# Patient Record
Sex: MALE | Race: WHITE | Employment: FULL TIME | URBAN - METROPOLITAN AREA
[De-identification: names, ages, dates, MRNs, and addresses within clinical notes are randomized per-mention and may not be internally consistent; named-entity substitution may affect disease eponyms.]

---

## 2017-01-10 ENCOUNTER — TELEPHONE (OUTPATIENT)
Dept: ORTHOPEDICS | Facility: CLINIC | Age: 47
End: 2017-01-10

## 2017-01-10 NOTE — TELEPHONE ENCOUNTER
S: Pt called triage today to cx appt scheduled for 1400 as he does not have transportation.      B: Pt states he is s/p Right tibia surgery done at outside facility on 7/9/2016.  Pt states he was recently treated for post-operative infection at [facility].    A: Pt states he has a red, raised area that is irritated with a yellowish/green drainage.  Pt was unsure of what abx he was on, but states he finished his treatment approximately 1 week ago.  Pt denies fever.  Pt rates pain as 5/10 at the time of the call, and states pain is worse with activity.    R: Pt advised to seek treatment at ED today.  Pt also has a return appt scheduled for 1/24/2017 @ 0830.  Clinic RN notified of call and pt concerns.    Pt expressed understanding and will call with any additional questions or concerns.    Laurie Sears LPN

## 2017-01-24 ENCOUNTER — OFFICE VISIT (OUTPATIENT)
Dept: ORTHOPEDICS | Facility: CLINIC | Age: 47
End: 2017-01-24

## 2017-01-24 DIAGNOSIS — M25.571 PAIN IN JOINT, ANKLE AND FOOT, RIGHT: Primary | ICD-10-CM

## 2017-01-24 RX ORDER — HYDROCODONE BITARTRATE AND ACETAMINOPHEN 5; 325 MG/1; MG/1
1 TABLET ORAL
Qty: 20 TABLET | Refills: 0 | Status: SHIPPED | OUTPATIENT
Start: 2017-01-24 | End: 2017-01-31

## 2017-01-24 NOTE — Clinical Note
1/24/2017       RE: Malcolm Jesus  [address]  [facility] [address]     Dear Colleague,    Thank you for referring your patient, Malcolm Jesus, to the [facility] at [facility]. Please see a copy of my visit note below.    CHIEF COMPLAINT:  Status post right leg open reduction internal fixation performed on 11/27/2013.      HISTORY OF PRESENT ILLNESS:  Mr. Jesus presents today for further followup.  Overall, he reports to have some pain and discomfort.  Reports to have had a history of drainage and infection along his right lower leg which apparently took place in [place] in 07/2016 and more recently 2 weeks ago while being here in [place].  Reports to be going back and forth between [place] and [place] secondary to his mom having cancer and going there to help her out.  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      PHYSICAL EXAMINATION:  On today's visit, he presents with some signs of previous drainage from the wound, although on today's visit there is no redness, there is no fluid fluctuance and there is no active drainage.  The patient is currently under a p.o. antibiotic medication.  Presents with an unchanged alignment of the right lower leg with full range of motion of the ankle.      RADIOGRAPHIC EVALUATION:  Plain x-rays were reviewed today which were significant for showing some failed hardware with quite a thickening of the fibula as it seems to be connected from proximal fibula into the distal tibia, which is the way for him to proceed with weightbearing.  Presents with approximately 20 degrees of varus alignment.  He also presents with a slight anterior recurvatum of the tibia as well.      ASSESSMENT:     1.  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   2.  Right tibia failed hardware.      PLAN:  I discussed with the patient that at this point I would like to  proceed with a CT scan as a way to better understand the bony anatomy of his right lower leg.  Based on that, we will recommend to proceed with either hardware removal versus hardware removal and ORIF of the tibia.      In the meantime, he will continue with his regimen of antibiotics.  On today's visit, he was given 20 tablets of Vicodin for pain control at nighttime only.      The patient will be evaluated in a week from today.  All questions were answered.      TT 15 minutes, CT 10 minutes.           Again, thank you for allowing me to participate in the care of your patient.      Sincerely,    Les Aguilera MD

## 2017-01-24 NOTE — PROGRESS NOTES
CHIEF COMPLAINT:  Status post right leg open reduction internal fixation performed on 11/27/2013.      HISTORY OF PRESENT ILLNESS:  Mr. Jesus presents today for further followup.  Overall, he reports to have some pain and discomfort.  Reports to have had a history of drainage and infection along his right lower leg which apparently took place in [place] in 07/2016 and more recently 2 weeks ago while being here in [place].  Reports to be going back and forth between [place] and [place] secondary to his mom having cancer and going there to help her out.  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      PHYSICAL EXAMINATION:  On today's visit, he presents with some signs of previous drainage from the wound, although on today's visit there is no redness, there is no fluid fluctuance and there is no active drainage.  The patient is currently under a p.o. antibiotic medication.  Presents with an unchanged alignment of the right lower leg with full range of motion of the ankle.      RADIOGRAPHIC EVALUATION:  Plain x-rays were reviewed today which were significant for showing some failed hardware with quite a thickening of the fibula as it seems to be connected from proximal fibula into the distal tibia, which is the way for him to proceed with weightbearing.  Presents with approximately 20 degrees of varus alignment.  He also presents with a slight anterior recurvatum of the tibia as well.      ASSESSMENT:     1.  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   2.  Right tibia failed hardware.      PLAN:  I discussed with the patient that at this point I would like to proceed with a CT scan as a way to better understand the bony anatomy of his right lower leg.  Based on that, we will recommend to proceed with either hardware removal versus hardware removal and ORIF of the tibia.      In the meantime, he will continue with his regimen of antibiotics.  On  today's visit, he was given 20 tablets of Vicodin for pain control at nighttime only.      The patient will be evaluated in a week from today.  All questions were answered.      TT 15 minutes, CT 10 minutes.

## 2017-01-24 NOTE — NURSING NOTE
Reason For Visit:   Chief Complaint   Patient presents with     Musculoskeletal Problem     R lower leg infection.          Pain Assessment  Patient Currently in Pain: Yes  0-10 Pain Scale: 8  Primary Pain Location: Leg  Pain Orientation: Right, Lower  Pain Descriptors: Sharp  Alleviating Factors: Pain medication  Aggravating Factors: Walking

## 2017-01-31 ENCOUNTER — OFFICE VISIT (OUTPATIENT)
Dept: ORTHOPEDICS | Facility: CLINIC | Age: 47
End: 2017-01-31

## 2017-01-31 VITALS — WEIGHT: 223.5 LBS | HEIGHT: 70 IN | BODY MASS INDEX: 32 KG/M2

## 2017-01-31 DIAGNOSIS — M25.571 PAIN IN JOINT, ANKLE AND FOOT, RIGHT: Primary | ICD-10-CM

## 2017-01-31 RX ORDER — HYDROCODONE BITARTRATE AND ACETAMINOPHEN 5; 325 MG/1; MG/1
1 TABLET ORAL
Qty: 25 TABLET | Refills: 0 | Status: SHIPPED | OUTPATIENT
Start: 2017-01-31 | End: 2017-03-09

## 2017-01-31 ASSESSMENT — ENCOUNTER SYMPTOMS
DECREASED APPETITE: 0
WEIGHT LOSS: 0
WEIGHT GAIN: 0
FEVER: 0

## 2017-01-31 NOTE — PROGRESS NOTES
CHIEF COMPLAINT:   1.  Right tibia infection.   2.  Right tibia nonunion.   3.  Status post right tibia ORIF with bone grafting performed on 11/27/2013.      HISTORY OF PRESENT ILLNESS:  Mr. Jesus presents today for further followup.  A CT scan has been obtained which is significant for showing a nonunion across the distal tibia.  He presents with some union from the proximal tibia into the distal tibia as well.      PHYSICAL EXAMINATION:  On today's exam, he continues having some scabbing across the most anterior aspect of the leg; however, there is with no open wound, drainage or gross sign of infection.      ASSESSMENT:   1.  Right tibia nonunion.   2.  Status post right tibia ORIF.   3.  Right leg infection, possible deep.      PLAN:  I discussed with the patient that at this point my recommendation would be for hardware removal and I&D of the right lower leg.  I discussed with him the most likely postoperative course and complications from undergoing such procedure.      I discussed with him that at this point he may require to undergo a tibia ORIF, something that cannot be performed in the presence of infection.      I discussed with him what it would entail to solve the infection as well as the fact that we have some flexibility on scheduling the surgery.      The patient will be contacting us if he is visiting his mom and the infection flares up, as he will be placed on ciprofloxacin 500 mg p.o. b.i.d. for treatment of the infection until we get it surgically addressed.      All questions were answered.  The patient was pleased with the discussion.  At this point, we are not planning to remove the hardware from the fibula, we will just address the distal tibia.  Until we have evidence that the fibula is also affected, if the infection extends all the way laterally, we will have to remove the fibula hardware as well.      We had a fairly lengthy discussion with regards to the importance of avoiding nicotine  as given the fact that this will jeopardize the healing of the skin as well.      All questions were answered.  The patient was pleased with the discussion.  On today's visit, he was given a prescription for Vicodin for 25 tablets only to be utilized at night time.      TT 15 minutes, CT 10 minutes.

## 2017-01-31 NOTE — Clinical Note
1/31/2017       RE: Malcolm Jesus  [address]  [facility] [address]     Dear Colleague,    Thank you for referring your patient, Malcolm Jesus, to the [facility] at [facility]. Please see a copy of my visit note below.    CHIEF COMPLAINT:   1.  Right tibia infection.   2.  Right tibia nonunion.   3.  Status post right tibia ORIF with bone grafting performed on 11/27/2013.      HISTORY OF PRESENT ILLNESS:  Mr. Jesus presents today for further followup.  A CT scan has been obtained which is significant for showing a nonunion across the distal tibia.  He presents with some union from the proximal tibia into the distal tibia as well.      PHYSICAL EXAMINATION:  On today's exam, he continues having some scabbing across the most anterior aspect of the leg; however, there is with no open wound, drainage or gross sign of infection.      ASSESSMENT:   1.  Right tibia nonunion.   2.  Status post right tibia ORIF.   3.  Right leg infection, possible deep.      PLAN:  I discussed with the patient that at this point my recommendation would be for hardware removal and I&D of the right lower leg.  I discussed with him the most likely postoperative course and complications from undergoing such procedure.      I discussed with him that at this point he may require to undergo a tibia ORIF, something that cannot be performed in the presence of infection.      I discussed with him what it would entail to solve the infection as well as the fact that we have some flexibility on scheduling the surgery.      The patient will be contacting us if he is visiting his mom and the infection flares up, as he will be placed on ciprofloxacin 500 mg p.o. b.i.d. for treatment of the infection until we get it surgically addressed.      All questions were answered.  The patient was pleased with the discussion.  At this point, we are not planning to remove the hardware from the  fibula, we will just address the distal tibia.  Until we have evidence that the fibula is also affected, if the infection extends all the way laterally, we will have to remove the fibula hardware as well.      We had a fairly lengthy discussion with regards to the importance of avoiding nicotine as given the fact that this will jeopardize the healing of the skin as well.      All questions were answered.  The patient was pleased with the discussion.  On today's visit, he was given a prescription for Vicodin for 25 tablets only to be utilized at night time.      TT 15 minutes, CT 10 minutes.         Again, thank you for allowing me to participate in the care of your patient.      Sincerely,    Les Aguilera MD

## 2017-01-31 NOTE — NURSING NOTE
"Reason For Visit:   Chief Complaint   Patient presents with     RECHECK     s/p right tibia ORIF with bone grafting DOS 11/27/2013. CT Result.        Pain Assessment  Patient Currently in Pain: Yes  0-10 Pain Scale: 6  Primary Pain Location: Tibia  Pain Orientation: Right  Pain Descriptors: Discomfort  Alleviating Factors: Pain medication, Rest  Aggravating Factors: Sitting, Movement, Walking               HEIGHT: 5' 10\", WEIGHT: 223 lbs 8 oz, BMI: Body mass index is 32.07 kg/(m^2).      Current Outpatient Prescriptions   Medication Sig Dispense Refill     Citalopram Hydrobromide (CELEXA PO)        HYDROcodone-acetaminophen (NORCO) 5-325 MG per tablet Take 1 tablet by mouth nightly as needed for moderate to severe pain 20 tablet 0     atenolol (TENORMIN) 50 MG tablet Take 50 mg by mouth daily.       alprazolam (XANAX) 1 MG tablet Take 1 mg by mouth 2 times daily as needed             Allergies   Allergen Reactions     Toradol Hives     "

## 2017-02-02 ENCOUNTER — TELEPHONE (OUTPATIENT)
Dept: ORTHOPEDICS | Facility: CLINIC | Age: 47
End: 2017-02-02

## 2017-02-02 NOTE — TELEPHONE ENCOUNTER
Patient called stating he would like to proceed with surgery, he would like to schedule it in about 30 days as he wants to work on continuing to decrease his nicotine use.  Pre-op teaching completed over the phone, Surgery scheduler Sophie to be notified that patient wants to schedule surgery.  Pain med use discussed as well, he said it's hard but he will comply with only taking 1 hydrocodone at bedtime.  Pre-op packet will be mailed to patients home upon scheduling.

## 2017-02-24 ENCOUNTER — TELEPHONE (OUTPATIENT)
Dept: ORTHOPEDICS | Facility: CLINIC | Age: 47
End: 2017-02-24

## 2017-02-24 DIAGNOSIS — B99.9 INFECTION: Primary | ICD-10-CM

## 2017-02-24 RX ORDER — CIPROFLOXACIN 500 MG/1
500 TABLET, FILM COATED ORAL 2 TIMES DAILY
Qty: 60 TABLET | Refills: 0 | Status: SHIPPED | OUTPATIENT
Start: 2017-02-24 | End: 2017-04-12

## 2017-02-24 NOTE — TELEPHONE ENCOUNTER
Patient called stating the infection in his leg is starting to look worse, it is swollen, puffy, and has increased drainage.  Per Dr. Aguilera's last office note he stated if the patients infection flared up we would put him on cipro 500mg BID until surgery. His surgery is scheduled for 3/24/17. Prescription for Cipro sent to his pharmacy.

## 2017-03-09 DIAGNOSIS — M25.571 PAIN IN JOINT, ANKLE AND FOOT, RIGHT: ICD-10-CM

## 2017-03-09 RX ORDER — HYDROCODONE BITARTRATE AND ACETAMINOPHEN 5; 325 MG/1; MG/1
1 TABLET ORAL
Qty: 14 TABLET | Refills: 0 | Status: SHIPPED | OUTPATIENT
Start: 2017-03-09 | End: 2017-04-10

## 2017-03-09 NOTE — TELEPHONE ENCOUNTER
Patient requesting refill of his pain medication. He was given 25 tablets 1/3/17 and was instructed to take 1 tablet at . He has been complying with the instructions. His surgery is scheduled for 3/24/17. He was given a refill of 14 tablets to last him until his surgery day.  Patient verbalized understanding and is agreeable with plan. He will  the prescription at patient .

## 2017-03-21 ENCOUNTER — TELEPHONE (OUTPATIENT)
Dept: ORTHOPEDICS | Facility: CLINIC | Age: 47
End: 2017-03-21

## 2017-03-21 NOTE — TELEPHONE ENCOUNTER
Called Cole to see if he has found someone to stay with him after surgery on Friday with Dr. Aguilera at [facility]. I asked him to let me know by noon on Thursday 3/23 if he is able to have someone with him. If he can't, we will need to reschedule him. I left him my direct call back number to use when he is able.

## 2017-03-24 NOTE — TELEPHONE ENCOUNTER
Spoke with patient this morning regarding him not having anyone to stay with him after his surgery.  He stated that he does not and will not ever be able to have someone stay with him. Our  Aura Chamberlain was consulted and she is recommending Cole contact his work comp and see if they are willing to pay for a private duty nurse to stay with him for 24 hours or if they are willing to pay for a TCU placement.  Because this is work comp she stated it has to be approved by them.  Patient was given the disability linkage line [phone] that will provide them with a list of facilities that provide private duty nursing. Patient will call back to update us on what is decided.

## 2017-04-05 RX ORDER — CEFAZOLIN SODIUM 1 G/3ML
1 INJECTION, POWDER, FOR SOLUTION INTRAMUSCULAR; INTRAVENOUS SEE ADMIN INSTRUCTIONS
Status: CANCELLED | OUTPATIENT
Start: 2017-04-12

## 2017-04-10 ENCOUNTER — TELEPHONE (OUTPATIENT)
Dept: ORTHOPEDICS | Facility: CLINIC | Age: 47
End: 2017-04-10

## 2017-04-10 NOTE — TELEPHONE ENCOUNTER
Left patient a voicemail in regards to his upcoming surgery on Wednesday. Received message from [facility] stating that they do not have a pre-op physical for the patient yet. Patient told our surgery scheduler that he has a friend who is going to be with him for surgery and staying with him for 24 hours after surgery as well.  Voicemail left asking patient to call back and confirm H&P.

## 2017-04-11 ENCOUNTER — ANESTHESIA EVENT (OUTPATIENT)
Dept: SURGERY | Facility: AMBULATORY SURGERY CENTER | Age: 47
End: 2017-04-11

## 2017-04-11 ENCOUNTER — TELEPHONE (OUTPATIENT)
Dept: ORTHOPEDICS | Facility: CLINIC | Age: 47
End: 2017-04-11

## 2017-04-12 ENCOUNTER — HOSPITAL ENCOUNTER (OUTPATIENT)
Facility: AMBULATORY SURGERY CENTER | Age: 47
End: 2017-04-12
Attending: ORTHOPAEDIC SURGERY

## 2017-04-12 ENCOUNTER — ANESTHESIA (OUTPATIENT)
Dept: SURGERY | Facility: AMBULATORY SURGERY CENTER | Age: 47
End: 2017-04-12

## 2017-04-12 VITALS
DIASTOLIC BLOOD PRESSURE: 85 MMHG | HEART RATE: 88 BPM | SYSTOLIC BLOOD PRESSURE: 122 MMHG | TEMPERATURE: 98.8 F | OXYGEN SATURATION: 97 % | RESPIRATION RATE: 16 BRPM | HEIGHT: 69 IN

## 2017-04-12 DIAGNOSIS — S82.231K CLOSED DISP OBLIQUE FRACTURE OF SHAFT OF RIGHT TIBIA WITH NONUNION: Primary | ICD-10-CM

## 2017-04-12 DIAGNOSIS — B99.9 INFECTION: ICD-10-CM

## 2017-04-12 LAB
GRAM STN SPEC: NORMAL
MICRO REPORT STATUS: NORMAL
SPECIMEN SOURCE: NORMAL

## 2017-04-12 RX ORDER — FENTANYL CITRATE 50 UG/ML
25-50 INJECTION, SOLUTION INTRAMUSCULAR; INTRAVENOUS
Status: DISCONTINUED | OUTPATIENT
Start: 2017-04-12 | End: 2017-04-12 | Stop reason: HOSPADM

## 2017-04-12 RX ORDER — ONDANSETRON 2 MG/ML
4 INJECTION INTRAMUSCULAR; INTRAVENOUS EVERY 30 MIN PRN
Status: DISCONTINUED | OUTPATIENT
Start: 2017-04-12 | End: 2017-04-13 | Stop reason: HOSPADM

## 2017-04-12 RX ORDER — AMOXICILLIN 250 MG
1-2 CAPSULE ORAL 2 TIMES DAILY
Qty: 30 TABLET | Refills: 0 | Status: SHIPPED | OUTPATIENT
Start: 2017-04-12

## 2017-04-12 RX ORDER — LIDOCAINE HYDROCHLORIDE 20 MG/ML
INJECTION, SOLUTION INFILTRATION; PERINEURAL PRN
Status: DISCONTINUED | OUTPATIENT
Start: 2017-04-12 | End: 2017-04-12

## 2017-04-12 RX ORDER — ACETAMINOPHEN 325 MG/1
975 TABLET ORAL ONCE
Status: COMPLETED | OUTPATIENT
Start: 2017-04-12 | End: 2017-04-12

## 2017-04-12 RX ORDER — ONDANSETRON 4 MG/1
4-8 TABLET, ORALLY DISINTEGRATING ORAL EVERY 8 HOURS PRN
Qty: 4 TABLET | Refills: 0 | Status: SHIPPED | OUTPATIENT
Start: 2017-04-12 | End: 2018-12-01

## 2017-04-12 RX ORDER — PROPOFOL 10 MG/ML
INJECTION, EMULSION INTRAVENOUS CONTINUOUS PRN
Status: DISCONTINUED | OUTPATIENT
Start: 2017-04-12 | End: 2017-04-12

## 2017-04-12 RX ORDER — BUPIVACAINE HYDROCHLORIDE AND EPINEPHRINE 5; 5 MG/ML; UG/ML
INJECTION, SOLUTION PERINEURAL PRN
Status: DISCONTINUED | OUTPATIENT
Start: 2017-04-12 | End: 2017-04-12

## 2017-04-12 RX ORDER — CIPROFLOXACIN 500 MG/1
500 TABLET, FILM COATED ORAL 2 TIMES DAILY
Qty: 28 TABLET | Refills: 0 | Status: SHIPPED | OUTPATIENT
Start: 2017-04-12 | End: 2017-04-26

## 2017-04-12 RX ORDER — DEXAMETHASONE SODIUM PHOSPHATE 4 MG/ML
INJECTION, SOLUTION INTRA-ARTICULAR; INTRALESIONAL; INTRAMUSCULAR; INTRAVENOUS; SOFT TISSUE PRN
Status: DISCONTINUED | OUTPATIENT
Start: 2017-04-12 | End: 2017-04-12

## 2017-04-12 RX ORDER — BUPIVACAINE HYDROCHLORIDE 2.5 MG/ML
INJECTION, SOLUTION EPIDURAL; INFILTRATION; INTRACAUDAL PRN
Status: DISCONTINUED | OUTPATIENT
Start: 2017-04-12 | End: 2017-04-12

## 2017-04-12 RX ORDER — ONDANSETRON 2 MG/ML
INJECTION INTRAMUSCULAR; INTRAVENOUS PRN
Status: DISCONTINUED | OUTPATIENT
Start: 2017-04-12 | End: 2017-04-12

## 2017-04-12 RX ORDER — SODIUM CHLORIDE, SODIUM LACTATE, POTASSIUM CHLORIDE, CALCIUM CHLORIDE 600; 310; 30; 20 MG/100ML; MG/100ML; MG/100ML; MG/100ML
INJECTION, SOLUTION INTRAVENOUS CONTINUOUS
Status: DISCONTINUED | OUTPATIENT
Start: 2017-04-12 | End: 2017-04-13 | Stop reason: HOSPADM

## 2017-04-12 RX ORDER — PROPOFOL 10 MG/ML
INJECTION, EMULSION INTRAVENOUS PRN
Status: DISCONTINUED | OUTPATIENT
Start: 2017-04-12 | End: 2017-04-12

## 2017-04-12 RX ORDER — HYDROXYZINE HYDROCHLORIDE 25 MG/1
25 TABLET, FILM COATED ORAL
Status: COMPLETED | OUTPATIENT
Start: 2017-04-12 | End: 2017-04-12

## 2017-04-12 RX ORDER — KETAMINE HYDROCHLORIDE 10 MG/ML
5-10 INJECTION, SOLUTION INTRAMUSCULAR; INTRAVENOUS
Status: DISCONTINUED | OUTPATIENT
Start: 2017-04-12 | End: 2017-04-13 | Stop reason: HOSPADM

## 2017-04-12 RX ORDER — FENTANYL CITRATE 50 UG/ML
INJECTION, SOLUTION INTRAMUSCULAR; INTRAVENOUS PRN
Status: DISCONTINUED | OUTPATIENT
Start: 2017-04-12 | End: 2017-04-12

## 2017-04-12 RX ORDER — SODIUM CHLORIDE, SODIUM LACTATE, POTASSIUM CHLORIDE, CALCIUM CHLORIDE 600; 310; 30; 20 MG/100ML; MG/100ML; MG/100ML; MG/100ML
INJECTION, SOLUTION INTRAVENOUS CONTINUOUS
Status: DISCONTINUED | OUTPATIENT
Start: 2017-04-12 | End: 2017-04-12 | Stop reason: HOSPADM

## 2017-04-12 RX ORDER — IBUPROFEN 200 MG
800 TABLET ORAL ONCE
Status: COMPLETED | OUTPATIENT
Start: 2017-04-12 | End: 2017-04-12

## 2017-04-12 RX ORDER — HYDROXYZINE HYDROCHLORIDE 25 MG/1
25 TABLET, FILM COATED ORAL EVERY 6 HOURS PRN
Qty: 30 TABLET | Refills: 0 | Status: SHIPPED | OUTPATIENT
Start: 2017-04-12

## 2017-04-12 RX ORDER — ACETAMINOPHEN 325 MG/1
650 TABLET ORAL EVERY 4 HOURS PRN
Qty: 100 TABLET | Refills: 0 | Status: SHIPPED | OUTPATIENT
Start: 2017-04-12

## 2017-04-12 RX ORDER — ONDANSETRON 4 MG/1
4 TABLET, ORALLY DISINTEGRATING ORAL
Status: DISCONTINUED | OUTPATIENT
Start: 2017-04-12 | End: 2017-04-13 | Stop reason: HOSPADM

## 2017-04-12 RX ORDER — MAGNESIUM HYDROXIDE 1200 MG/15ML
LIQUID ORAL PRN
Status: DISCONTINUED | OUTPATIENT
Start: 2017-04-12 | End: 2017-04-12 | Stop reason: HOSPADM

## 2017-04-12 RX ORDER — OXYCODONE HYDROCHLORIDE 5 MG/1
5-10 TABLET ORAL
Qty: 50 TABLET | Refills: 0 | Status: SHIPPED | OUTPATIENT
Start: 2017-04-12 | End: 2017-04-19

## 2017-04-12 RX ORDER — FENTANYL CITRATE 50 UG/ML
100 INJECTION, SOLUTION INTRAMUSCULAR; INTRAVENOUS ONCE
Status: DISCONTINUED | OUTPATIENT
Start: 2017-04-12 | End: 2017-04-13 | Stop reason: HOSPADM

## 2017-04-12 RX ORDER — ACETAMINOPHEN 10 MG/ML
1000 INJECTION, SOLUTION INTRAVENOUS ONCE
Status: COMPLETED | OUTPATIENT
Start: 2017-04-12 | End: 2017-04-12

## 2017-04-12 RX ORDER — ONDANSETRON 4 MG/1
4 TABLET, ORALLY DISINTEGRATING ORAL EVERY 30 MIN PRN
Status: DISCONTINUED | OUTPATIENT
Start: 2017-04-12 | End: 2017-04-13 | Stop reason: HOSPADM

## 2017-04-12 RX ORDER — MEPERIDINE HYDROCHLORIDE 25 MG/ML
12.5 INJECTION INTRAMUSCULAR; INTRAVENOUS; SUBCUTANEOUS
Status: DISCONTINUED | OUTPATIENT
Start: 2017-04-12 | End: 2017-04-13 | Stop reason: HOSPADM

## 2017-04-12 RX ORDER — NALOXONE HYDROCHLORIDE 0.4 MG/ML
.1-.4 INJECTION, SOLUTION INTRAMUSCULAR; INTRAVENOUS; SUBCUTANEOUS
Status: DISCONTINUED | OUTPATIENT
Start: 2017-04-12 | End: 2017-04-13 | Stop reason: HOSPADM

## 2017-04-12 RX ORDER — ACETAMINOPHEN 325 MG/1
650 TABLET ORAL
Status: DISCONTINUED | OUTPATIENT
Start: 2017-04-12 | End: 2017-04-13 | Stop reason: HOSPADM

## 2017-04-12 RX ORDER — OXYCODONE HYDROCHLORIDE 5 MG/1
5-10 TABLET ORAL
Status: COMPLETED | OUTPATIENT
Start: 2017-04-12 | End: 2017-04-12

## 2017-04-12 RX ORDER — GABAPENTIN 300 MG/1
300 CAPSULE ORAL ONCE
Status: COMPLETED | OUTPATIENT
Start: 2017-04-12 | End: 2017-04-12

## 2017-04-12 RX ADMIN — SODIUM CHLORIDE, SODIUM LACTATE, POTASSIUM CHLORIDE, CALCIUM CHLORIDE: 600; 310; 30; 20 INJECTION, SOLUTION INTRAVENOUS at 09:25

## 2017-04-12 RX ADMIN — BUPIVACAINE HYDROCHLORIDE AND EPINEPHRINE 25 ML: 5; 5 INJECTION, SOLUTION PERINEURAL at 11:21

## 2017-04-12 RX ADMIN — BUPIVACAINE HYDROCHLORIDE 20 ML: 2.5 INJECTION, SOLUTION EPIDURAL; INFILTRATION; INTRACAUDAL at 11:54

## 2017-04-12 RX ADMIN — FENTANYL CITRATE 50 MCG: 50 INJECTION, SOLUTION INTRAMUSCULAR; INTRAVENOUS at 11:35

## 2017-04-12 RX ADMIN — FENTANYL CITRATE 50 MCG: 50 INJECTION, SOLUTION INTRAMUSCULAR; INTRAVENOUS at 09:54

## 2017-04-12 RX ADMIN — Medication 800 MG: at 13:00

## 2017-04-12 RX ADMIN — DEXAMETHASONE SODIUM PHOSPHATE 4 MG: 4 INJECTION, SOLUTION INTRA-ARTICULAR; INTRALESIONAL; INTRAMUSCULAR; INTRAVENOUS; SOFT TISSUE at 09:41

## 2017-04-12 RX ADMIN — PROPOFOL 200 MCG/KG/MIN: 10 INJECTION, EMULSION INTRAVENOUS at 09:38

## 2017-04-12 RX ADMIN — ACETAMINOPHEN 975 MG: 325 TABLET ORAL at 08:59

## 2017-04-12 RX ADMIN — FENTANYL CITRATE 50 MCG: 50 INJECTION, SOLUTION INTRAMUSCULAR; INTRAVENOUS at 11:12

## 2017-04-12 RX ADMIN — FENTANYL CITRATE 50 MCG: 50 INJECTION, SOLUTION INTRAMUSCULAR; INTRAVENOUS at 11:10

## 2017-04-12 RX ADMIN — PROPOFOL 100 MG: 10 INJECTION, EMULSION INTRAVENOUS at 09:39

## 2017-04-12 RX ADMIN — FENTANYL CITRATE 50 MCG: 50 INJECTION, SOLUTION INTRAMUSCULAR; INTRAVENOUS at 11:20

## 2017-04-12 RX ADMIN — Medication 150 MCG: at 09:45

## 2017-04-12 RX ADMIN — ONDANSETRON 4 MG: 2 INJECTION INTRAMUSCULAR; INTRAVENOUS at 09:41

## 2017-04-12 RX ADMIN — OXYCODONE HYDROCHLORIDE 5 MG: 5 TABLET ORAL at 12:31

## 2017-04-12 RX ADMIN — LIDOCAINE HYDROCHLORIDE 100 MG: 20 INJECTION, SOLUTION INFILTRATION; PERINEURAL at 09:38

## 2017-04-12 RX ADMIN — PROPOFOL: 10 INJECTION, EMULSION INTRAVENOUS at 09:56

## 2017-04-12 RX ADMIN — GABAPENTIN 300 MG: 300 CAPSULE ORAL at 08:59

## 2017-04-12 RX ADMIN — PROPOFOL: 10 INJECTION, EMULSION INTRAVENOUS at 10:40

## 2017-04-12 RX ADMIN — ACETAMINOPHEN 1000 MG: 10 INJECTION, SOLUTION INTRAVENOUS at 13:00

## 2017-04-12 RX ADMIN — OXYCODONE HYDROCHLORIDE 5 MG: 5 TABLET ORAL at 13:10

## 2017-04-12 RX ADMIN — Medication 0.2 MG: at 11:44

## 2017-04-12 RX ADMIN — FENTANYL CITRATE 50 MCG: 50 INJECTION, SOLUTION INTRAMUSCULAR; INTRAVENOUS at 09:58

## 2017-04-12 RX ADMIN — Medication 0.3 MG: at 11:35

## 2017-04-12 RX ADMIN — HYDROXYZINE HYDROCHLORIDE 25 MG: 25 TABLET, FILM COATED ORAL at 12:31

## 2017-04-12 RX ADMIN — PROPOFOL 300 MG: 10 INJECTION, EMULSION INTRAVENOUS at 09:38

## 2017-04-12 RX ADMIN — Medication 1 MG: at 11:06

## 2017-04-12 ASSESSMENT — LIFESTYLE VARIABLES: TOBACCO_USE: 1

## 2017-04-12 NOTE — IP AVS SNAPSHOT
[facility]    [address] [place] [id number]    Phone:  [phone]    Fax:  [phone]                                       After Visit Summary   4/12/2017    Malcolm Jesus    MRN: [id number]           After Visit Summary Signature Page     I have received my discharge instructions, and my questions have been answered. I have discussed any challenges I see with this plan with the nurse or doctor.    ..........................................................................................................................................  Patient/Patient Representative Signature      ..........................................................................................................................................  Patient Representative Print Name and Relationship to Patient    ..................................................               ................................................  Date                                            Time    ..........................................................................................................................................  Reviewed by Signature/Title    ...................................................              ..............................................  Date                                                            Time

## 2017-04-12 NOTE — OP NOTE
DATE OF PROCEDURE:  04/12/2017      PREOPERATIVE DIAGNOSES:   1.  Right tibia nonunion.   2.  Right tibia infection.      POSTOPERATIVE DIAGNOSIS:     1.  Right tibia nonunion.   2.  Right tibia infection.      PROCEDURE:     1.  Right distal tibia hardware removal.   2.  Right distal tibia irrigation and debridement.      SURGEON:  Les Aguilera MD      ASSISTANT:  Mike Bright MD, orthopedic resident       COMPLICATIONS:  None.      DRAINS:  None.      ESTIMATED BLOOD LOSS:  Less than 20 mL.      ANESTHESIA:  General endotracheal.      PATHOLOGY:  Routine cultures for aerobe, anaerobe, Gram stain and fungal from the right distal tibia, deep in nature with a clean collection x2.      INDICATIONS:  Please refer to clinic notes for further details regarding indications of Mr. Jesus's case.      DESCRIPTION OF PROCEDURE:  Patient was taken to surgery.  Preoperative antibiotics were held until satisfactory culture samples were obtained.      After successful induction of general endotracheal anesthesia, he was placed supine on the operating table.  The right lower extremity was prepped and draped in sterile fashion, and after exsanguination by gravity, tourniquet cuff was inflated to 300 mmHg along the proximal third of the right thigh.      A pause for the cause was performed according to the institution's policy which confirmed laterality to the procedure.      Following this, we proceeded with incision along the previous surgical incision for both the anterior tibia and another one slightly anterior to the former one for the fibula.  Subcutaneous tissues were dissected.  We proceeded with identification of the hardware, and we proceeded with extraction of all hardware except for the broken bones imbedded into the tibia.      The patient presented with no gross purulent material but definitely with some not healthy looking tissue, especially along the most distal portion of the plate.      Of note, the  patient presented with a prominent plate through the skin proximally which made the wound closure quite difficult given the fact that he already presented with wound dehiscence.      We proceeded with an aggressive debridement of the area with a combination of curettage, osteotomes, rongeurs, Scott elevators in order to proceed with debridement of the cortex of the tibia.  This was extended for approximately a total length of 18-20 cm by a width of approximately 3-4 cm.  A similar approach was performed across the fibula as well with similar instrumentation.      Finally, we proceeded with irrigation with a total of 2 liters of normal saline with a pulsatile lavage and we proceeded with closure of the wound with Prolene suture and a full thickness but without any deep sutures.      Prior to closure, a tourniquet cuff was deflated.      Finally, sterile dressings were applied.  The patient was placed in a toe CAM Walker and transferred in stable condition to PACU.      PLAN:  The patient will remain weightbearing as tolerated.  The patient will have cultures reviewed.  He will be contacted within 48 hours if he would require the use of a PICC line.      The patient will return to clinic in a week from surgery for a wound check and 2 weeks from surgery for suture removal.        Eventually the patient will proceed with activities as tolerated and will have to evaluate the possibility of undergoing right tibia open reduction internal fixation based on his overall response as well as the presence of infection or not.      The patient will be reevaluated for sure at 6 weeks from surgery, and at that time, AP and lateral x-rays of the tib-fib will be obtained.         ANASTACIO KESSLER MD             D: 2017 11:10   T: 2017 12:22   MT: laisha      Name:     KAILASH TAVAREZ   MRN:      [id number]        Account:        [id number]   :      1970           Procedure Date: 2017      Document: [id number]

## 2017-04-12 NOTE — BRIEF OP NOTE
Orthopedic Brief Operative Note    Pre-operative diagnosis: Right Tibia Non-Union   Post-operative diagnosis: SAME   Procedure: Procedure(s):  Right tibia and fibular removal of implants.  IRRIGATION AND DEBRIDEMENT right LOWER EXTREMITY   Surgeon: Les Aguilera, *    Assistant(s): Mike Brihgt MD   Anesthesia: General endotracheal anesthesia   Estimated blood loss: Less than 50 ml   Total IV fluids: (See anesthesia record)   Total urine output: Not measured   Drains: None   Specimens: Cultures, plates and screws removed   Implants: none   Findings: See dictated operative report for full details   Complications: None   Disposition: Stable to PACU     ___________________________________________________________________________    Postoperative Plan:  Activity:  WBAT BLE. ROM as tolerated. Restrictions: CAM boot all times when upright.   Antibiotics:  Ancef given preoperatively; pt continued on cipro 500 bid for 14 d postop until clinic f/u.   Diet:  Restart preoperative diet  Pain:  Oxycodone, acetaminophen and atarax PRN; wean as able  DVT ppx:  Ambulation.   Imaging:  Post op films in PACU.    Dispo:  Home when meeting same day criteria    Follow up:  2 weeks post op w/ Les Aguilera, *    ___________________________________________________________________________  Future Appointments  Date Time Provider Department [place]   4/26/2017 1:30 PM Nurse, [facility]   5/16/2017 1:10 PM Les Aguilera MD [facility]       ___________________________________________________________________________  Mike Bright MD  04/12/2017  Pager [phone]

## 2017-04-12 NOTE — ANESTHESIA PREPROCEDURE EVALUATION
Anesthesia Evaluation     . Pt has had prior anesthetic. Type: General           ROS/MED HX    ENT/Pulmonary:     (+)sleep apnea, tobacco use, Current use , . .    Neurologic:  - neg neurologic ROS     Cardiovascular:     (+) ----. Taking blood thinners : . . . :. .       METS/Exercise Tolerance:  >4 METS   Hematologic:  - neg hematologic  ROS       Musculoskeletal:  - neg musculoskeletal ROS       GI/Hepatic:     (+) GERD Asymptomatic on medication,       Renal/Genitourinary:  - ROS Renal section negative       Endo:  - neg endo ROS       Psychiatric:  - neg psychiatric ROS       Infectious Disease:   (+) Other Infectious Disease       Malignancy:         Other:                     Physical Exam  Normal systems: dental    Airway   Mallampati: I  TM distance: >3 FB  Neck ROM: full    Dental     Cardiovascular   Rhythm and rate: regular and normal      Pulmonary    breath sounds clear to auscultation                    Anesthesia Plan      History & Physical Review  History and physical reviewed and following examination; no interval change.    ASA Status:  2 .    NPO Status:  > 6 hours    Plan for MAC with Intravenous induction. Maintenance will be TIVA.  Reason for MAC:  Deep or markedly invasive procedure (G8)  PONV prophylaxis:  Ondansetron (or other 5HT-3) and Dexamethasone or Solumedrol       Postoperative Care  Postoperative pain management:  Oral pain medications and Multi-modal analgesia.      Consents  Anesthetic plan, risks, benefits and alternatives discussed with:  Patient..                          .

## 2017-04-12 NOTE — ANESTHESIA PROCEDURE NOTES
Peripheral Nerve Block Procedure Note    Staff:     Anesthesiologist:  BILL WARREN    Referred By:  ANASTACIO KESSLER  Location: PACU  Procedure Start/Stop TImes:      4/12/2017 11:17 AM     4/12/2017 11:27 AM    patient identified, IV checked, site marked, risks and benefits discussed, informed consent, monitors and equipment checked, pre-op evaluation, at physician/surgeon's request and post-op pain management      Correct Patient: Yes      Correct Position: Yes      Correct Site: Yes      Correct Procedure: Yes      Correct Laterality:  Yes    Site Marked:  Yes  Procedure details:     Procedure:  Popliteal    ASA:  2    Laterality:  Right    Position:  Supine    Sterile Prep: chloraprep, mask and sterile gloves      Needle:  Insulated and short bevel    Needle gauge:  21    Needle length (inches):  4    Ultrasound: Yes      Ultrasound used to identify targeted nerve, plexus, or vascular structure and placed a needle adjacent to it      Permanent Image entered into patiient's record      Abnormal pain on injection: No      Blood Aspirated: No      Paresthesias:  No    Bleeding at site: No      Bolus via:  Needle    Infusion Method:  Single Shot    Complications:  None

## 2017-04-12 NOTE — OR NURSING
Explants-Plates and Screws placed in a plastic bag with labels to be cleaned and sterilized and returned to patient at next clinic visit.

## 2017-04-12 NOTE — DISCHARGE INSTRUCTIONS
[facility]  Home Care Following Anesthesia  For 24 hours after surgery:  1. Get plenty of rest.  A responsible adult must stay with you for at least 24 hours after you leave the surgery center.  2. Do not drive or use heavy equipment.  If you have weakness or tingling, don't drive or use heavy equipment until this feeling goes away.   3. Do not drink alcohol.   4. Avoid strenuous or risky activities.  Ask for help when climbing stairs.  5. You may feel lightheaded.  IF so, sit for a few minutes before standing.  Have someone help you get up.   6. If you have nausea (feel sick to your stomach): Drink only clear liquids such as apple juice, ginger ale, broth or 7-Up.  Rest may also help.  Be sure to drink enough fluids.  Move to a regular diet as you feel able.   7. You may have a slight fever.  Call the doctor if your fever is over 100 F (37.7 C) (taken under the tongue) or lasts longer than 24 hours.  8. You may have a dry mouth, a sore throat, muscle aches or trouble sleeping. These should go away after 24 hours.  9. Do not make important or legal decisions.     Tips for taking pain medications  To get the best pain relief possible, remember these points:    Take pain medications as directed, before pain becomes severe.    Pain medication can upset your stomach: taking it with food may help.    Constipation is a common side effect of pain medication. Drink plenty of  fluids.    Eat foods high in fiber. Take a stool softener if recommended by your doctor or pharmacist.    Do not drink alcohol, drive or operate machinery while taking pain medications.    Ask about other ways to control pain, such as with heat, ice or relaxation.    Call a doctor for any of the followin. Signs of infection (fever, growing tenderness at the surgery site, a large amount of drainage or bleeding, severe pain, foul-smelling drainage, redness, swelling).  2. It has been over 8 to 10 hours since  surgery and you are still not able to urinate (pass water).  3. Headache for over 24 hours.  4. Numbness, tingling or weakness the day after surgery (if you had spinal anesthesia).  Your doctor is:       Dr. Les Aguilera, Orthopaedics: [phone]               Or dial [phone] and ask for the resident on call for:  Orthopaedics  For emergency care, call the:  [facility]:  [phone] (TTY for hearing impaired: [phone])    Post Operative Instructions: Regional Anesthetic for Lower Extremity     General Information:   Regional anesthesia is when local anesthetic or  numbing  medication is injected around the nerves to anesthetize or  numb  the area supplied by that set of nerves. It is a type of analgesia used to control pain and decreases the need for narcotics following surgery.    Types of Regional Blocks:  Femoral: A block injected into the groin area of the operative leg of a patient having thigh or knee surgery.  Adductor Canal: A block injected into the mid thigh of the operative leg of a patient having knee or ankle surgery.  Popliteal or Distal Sciatic: A block injected into the back of the knee of the operative leg of patients having foot or ankle surgery.   Ankle:  An anesthetic medication is injected into the ankle of the operative leg of a patient having foot or toe surgery.     Procedure:   The type of anesthesia your doctor used to numb your leg will usually not wear off for 6-18 hours, but may last as long as 24 hours. You should be careful during that period, since it is possible to injure your leg without being aware of the injury. While your leg is numb you should:    Use crutches (minimal weight bearing until your motor and strength is completely back to normal)    Avoid striking or bumping your leg    Avoid extreme hot or cold    Discomfort:  You will have a tingling and prickly sensation in your leg as the feeling begins to return; you can also expect some  discomfort. The amount of discomfort is unpredictable, but if you have more pain than can be controlled with pain medication, you should notify your physician.     Pain Medicine:   Begin taking your oral pain pills (if you have not already done so) before bedtime and during the night to avoid a sudden onset of pain as the block wears off.  Do not engage in drinking, driving, or hazardous occupations while taking pain medication.

## 2017-04-12 NOTE — IP AVS SNAPSHOT
MRN:[id number]                      After Visit Summary   4/12/2017    Malcolm Jesus    MRN: [id number]           Thank you!     Thank you for choosing [facility] for your care. Our goal is always to provide you with excellent care. Hearing back from our patients is one way we can continue to improve our services. Please take a few minutes to complete the written survey that you may receive in the mail after you visit with us. Thank you!        Patient Information     Date Of Birth          1970        About your hospital stay     You were admitted on:  April 12, 2017 [facility]    You were discharged on:  April 12, 2017       Who to Call     For medical emergencies, please call 911.  For non-urgent questions about your medical care, please call your primary care provider or clinic, [phone]  For questions related to your surgery, please call your surgery clinic        Attending Provider     Provider Les Day MD Orthopedics       Primary Care Provider Office Phone # Fax #    Ankit Phelan [phone]       [facility] [address] [facility] [place] [address]        After Care Instructions     Activity       Ambulate with assistance until independent            Discharge Instructions       Review discharge instructions as directed by Provider.    4/12/2017     Surgery is day surgery, meaning you come in and leave the same day.  The choice of the anesthesia that you will require will be something that you, I, and the anesthesiologist will discuss together.    Before surgery and after surgery, you should also elevate the operated extremity above the level of the heart. This may be done by placing your leg/foot on some pillows or resting it at a level above the heart.  This is important to decrease swelling, which will also decrease your level of  pain.    Following surgery, you will be given a prescription for one of several narcotic medications.  These have multiple side effects and should be discontinued as soon as you are able to tolerate non-narcotic medications.  Narcotic medications (vicodin or Hydrocodone, Percocet or Oxycodone, or Tylenol with Codeine) have been associated with nausea, drowsiness, and constipation. If you experience nausea , this may be relieved by taking the medication with food or a light meal.  To avoid constipation, over the counter stool softener or drink lots of water and eat fruits and vegetables.  Avoid operating heavy machinery or driving an automobile while on narcotic medications.    After surgery, you will be seen somewhere between 10 - 14 days for your first postoperative visit.  At the time, we will remove your dressing.     Please keep CAM boot on at all times.  Weight bearing as tolerated to the operated leg.    After your original postoperative splint is removed, showering is okay for the wound as long as the wound is not soaked or exposed to dirty water, such as swimming pools, hot tubs, baths, or dirty dishwater.    Please call or return if you experience the following:  Fevers (temperature greater than 100.4 degrees)  Pain that is getting worse or does not respond to pain medications.  Any other worrisome symptoms.    You may reach the clinic by dialing [phone]. After hours, you may reach the resident by calling [phone].    Dr Les Aguilera  [address] [address]            Discharge Instructions       Patient to arrange for return to clinic appointment in two weeks.            Elevate affected extremity           Ice to affected area       Ice pack to affected area PRN (as needed).            No dressing change       until follow up clinic appointment.            Weight bearing status - As tolerated                 Your next 10 appointments already  scheduled     Apr 26, 2017  1:30 PM CDT   (Arrive by 1:15 PM)   Post-Op with [facility]   [facility] ([facility])    [address] [facility] [id number] [phone]            May 16, 2017  1:10 PM CDT   (Arrive by 12:55 PM)   Return Visit with Les Aguilera MD   [facility] ([facility])    [address] [facility] [id number] [phone]              Further instructions from your care team       [facility]  Home Care Following Anesthesia  For 24 hours after surgery:  1. Get plenty of rest.  A responsible adult must stay with you for at least 24 hours after you leave the surgery center.  2. Do not drive or use heavy equipment.  If you have weakness or tingling, don't drive or use heavy equipment until this feeling goes away.   3. Do not drink alcohol.   4. Avoid strenuous or risky activities.  Ask for help when climbing stairs.  5. You may feel lightheaded.  IF so, sit for a few minutes before standing.  Have someone help you get up.   6. If you have nausea (feel sick to your stomach): Drink only clear liquids such as apple juice, ginger ale, broth or 7-Up.  Rest may also help.  Be sure to drink enough fluids.  Move to a regular diet as you feel able.   7. You may have a slight fever.  Call the doctor if your fever is over 100 F (37.7 C) (taken under the tongue) or lasts longer than 24 hours.  8. You may have a dry mouth, a sore throat, muscle aches or trouble sleeping. These should go away after 24 hours.  9. Do not make important or legal decisions.     Tips for taking pain medications  To get the best pain relief possible, remember these points:    Take pain medications as directed, before pain becomes severe.    Pain medication can upset your stomach: taking it with food may help.    Constipation is a common side effect of pain  medication. Drink plenty of  fluids.    Eat foods high in fiber. Take a stool softener if recommended by your doctor or pharmacist.    Do not drink alcohol, drive or operate machinery while taking pain medications.    Ask about other ways to control pain, such as with heat, ice or relaxation.    Call a doctor for any of the followin. Signs of infection (fever, growing tenderness at the surgery site, a large amount of drainage or bleeding, severe pain, foul-smelling drainage, redness, swelling).  2. It has been over 8 to 10 hours since surgery and you are still not able to urinate (pass water).  3. Headache for over 24 hours.  4. Numbness, tingling or weakness the day after surgery (if you had spinal anesthesia).  Your doctor is:       Dr. Les Aguilera, Orthopaedics: [phone]               Or dial [phone] and ask for the resident on call for:  Orthopaedics  For emergency care, call the:  [facility]:  [phone] (TTY for hearing impaired: [phone])    Post Operative Instructions: Regional Anesthetic for Lower Extremity     General Information:   Regional anesthesia is when local anesthetic or  numbing  medication is injected around the nerves to anesthetize or  numb  the area supplied by that set of nerves. It is a type of analgesia used to control pain and decreases the need for narcotics following surgery.    Types of Regional Blocks:  Femoral: A block injected into the groin area of the operative leg of a patient having thigh or knee surgery.  Adductor Canal: A block injected into the mid thigh of the operative leg of a patient having knee or ankle surgery.  Popliteal or Distal Sciatic: A block injected into the back of the knee of the operative leg of patients having foot or ankle surgery.   Ankle:  An anesthetic medication is injected into the ankle of the operative leg of a patient having foot or toe surgery.     Procedure:   The type of anesthesia your doctor used to  "numb your leg will usually not wear off for 6-18 hours, but may last as long as 24 hours. You should be careful during that period, since it is possible to injure your leg without being aware of the injury. While your leg is numb you should:    Use crutches (minimal weight bearing until your motor and strength is completely back to normal)    Avoid striking or bumping your leg    Avoid extreme hot or cold    Discomfort:  You will have a tingling and prickly sensation in your leg as the feeling begins to return; you can also expect some discomfort. The amount of discomfort is unpredictable, but if you have more pain than can be controlled with pain medication, you should notify your physician.     Pain Medicine:   Begin taking your oral pain pills (if you have not already done so) before bedtime and during the night to avoid a sudden onset of pain as the block wears off.  Do not engage in drinking, driving, or hazardous occupations while taking pain medication.               Pending Results     Date and Time Order Name Status Description    4/12/2017 1011 Gram stain Preliminary     4/12/2017 1011 Fungus Culture, non-blood Preliminary     4/12/2017 1011 Fluid Culture Aerobic Bacterial Preliminary     4/12/2017 1011 Anaerobic bacterial culture Preliminary             Admission Information     Date & Time Provider Department Dept. Phone    4/12/2017 Les Aguilera MD [facility] [phone]      Your Vitals Were     Blood Pressure Pulse Temperature Respirations Height Pulse Oximetry    122/85 88 98.8  F (37.1  C) (Temporal) 16 1.753 m (5' 9\") 97%      MyChart Information     [employer] is an electronic gateway that provides easy, online access to your medical records. With [employer], you can request a clinic appointment, read your test results, renew a prescription or communicate with your care team.     To sign up for [employer] visit the website at [email or web address]   You will be " asked to enter the access code listed below, as well as some personal information. Please follow the directions to create your username and password.     Your access code is: [email or web address]  Expires: 2017  7:30 AM     Your access code will  in 90 days. If you need help or a new code, please contact your [facility] or call [phone] for assistance.        Care EveryWhere ID     This is your Care EveryWhere ID. 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   Quantity:  30 tablet   Refills:  0         CONTINUE these medicines which have NOT CHANGED        Dose / Directions    atenolol 50 MG tablet   Commonly known as:  TENORMIN        Dose:  50 mg   Take 50 mg by mouth every morning   Refills:  0       CELEXA PO        Refills:  0       ciprofloxacin 500 MG tablet   Commonly known as:  CIPRO   Used for:  Infection        Dose:  500 mg   Take 1 tablet (500 mg) by mouth 2 times daily for 14 days   Quantity:  28 tablet   Refills:  0       XANAX 1 MG tablet   Generic drug:  ALPRAZolam        Dose:  1 mg   Take 1 mg by mouth 2 times daily as needed   Refills:  0            Where to get your medicines      These medications were sent to [address] [place] [address] [address] [place] [address]    Hours:  TRANSPLANT PHONE NUMBER [phone] Phone:  [phone]     acetaminophen 325 MG tablet    hydrOXYzine 25 MG tablet    ondansetron 4 MG ODT tab    senna-docusate 8.6-50 MG per tablet         Some of these will need a paper prescription and others can be bought over the counter. Ask your nurse if you have questions.     Bring a paper prescription for each of these medications     ciprofloxacin 500 MG tablet    oxyCODONE 5 MG IR tablet                Protect others around you: Learn how to safely use, store and throw away your medicines at www.disposemymeds.org.             Medication List: This is a list of all your medications and when to take them. Check marks below indicate your daily home schedule. Keep this list as a reference.      Medications           Morning Afternoon Evening Bedtime As Needed    acetaminophen 325 MG tablet   Commonly known as:  TYLENOL   Take 2 tablets (650 mg) by mouth every 4 hours as needed for other (mild pain)   Last time this was  given:  975 mg on 4/12/2017  8:59 AM                                atenolol 50 MG tablet   Commonly known as:  TENORMIN   Take 50 mg by mouth every morning                                CELEXA PO                                ciprofloxacin 500 MG tablet   Commonly known as:  CIPRO   Take 1 tablet (500 mg) by mouth 2 times daily for 14 days                                hydrOXYzine 25 MG tablet   Commonly known as:  ATARAX   Take 1 tablet (25 mg) by mouth every 6 hours as needed for itching (and nausea)   Last time this was given:  25 mg on 4/12/2017 12:31 PM                                ondansetron 4 MG ODT tab   Commonly known as:  ZOFRAN-ODT   Take 1-2 tablets (4-8 mg) by mouth every 8 hours as needed for nausea Dissolve ON the tongue.                                oxyCODONE 5 MG IR tablet   Commonly known as:  ROXICODONE   Take 1-2 tablets (5-10 mg) by mouth every 3 hours as needed for pain or other (Moderate to Severe)   Last time this was given:  5 mg on 4/12/2017  1:10 PM                                senna-docusate 8.6-50 MG per tablet   Commonly known as:  SENOKOT-S;PERICOLACE   Take 1-2 tablets by mouth 2 times daily Take while on oral narcotics to prevent or treat constipation.                                XANAX 1 MG tablet   Take 1 mg by mouth 2 times daily as needed   Generic drug:  ALPRAZolam

## 2017-04-12 NOTE — ANESTHESIA PROCEDURE NOTES
Peripheral Nerve Block Procedure Note    Staff:     Anesthesiologist:  BILL WARREN    Referred By:  ANASTACIO KESSLER  Location: PACU  Procedure Start/Stop TImes:      4/12/2017 11:48 AM     4/12/2017 11:58 AM    patient identified, IV checked, site marked, risks and benefits discussed, informed consent, monitors and equipment checked, pre-op evaluation, at physician/surgeon's request and post-op pain management      Correct Patient: Yes      Correct Position: Yes      Correct Site: Yes      Correct Procedure: Yes      Correct Laterality:  Yes    Site Marked:  Yes  Procedure details:     Procedure:  Adductor canal    ASA:  2    Laterality:  Right    Position:  Supine    Sterile Prep: chloraprep, mask and sterile gloves      Needle:  Insulated and short bevel    Needle gauge:  21    Needle length (inches):  4    Ultrasound: Yes      Ultrasound used to identify targeted nerve, plexus, or vascular structure and placed a needle adjacent to it      Permanent Image entered into patiient's record      Abnormal pain on injection: No      Blood Aspirated: No      Paresthesias:  No    Bleeding at site: No      Bolus via:  Needle    Infusion Method:  Single Shot    Complications:  None

## 2017-04-12 NOTE — ANESTHESIA POSTPROCEDURE EVALUATION
Patient: Malcolm Jesus    Procedure(s):  Right Tibia Irrigation and Debridement with Hardware Removal - Wound Class: III-Contaminated    Diagnosis:Right Tibia Non-Union  Diagnosis Additional Information: No value filed.    Anesthesia Type:  No value filed.    Note:  Anesthesia Post Evaluation    Patient location during evaluation: bedside  Patient participation: Able to fully participate in evaluation  Level of consciousness: awake  Pain management: adequate  Airway patency: patent  Cardiovascular status: acceptable  Respiratory status: acceptable  Hydration status: acceptable  PONV: controlled     Anesthetic complications: None    Comments: Pain control improved after popliteal and adductor block        Last vitals:  Vitals:    04/12/17 0843   BP: (!) 165/100   Pulse: 88   Resp: 16   Temp: 37.1  C (98.8  F)         Electronically Signed By: Grover Ruiz MD, MD  April 12, 2017  11:09 AM

## 2017-04-12 NOTE — ANESTHESIA CARE TRANSFER NOTE
"Patient: Malcolm Jesus    Procedure(s):  Right Tibia Irrigation and Debridement with Hardware Removal - Wound Class: III-Contaminated    Diagnosis: Right Tibia Non-Union  Diagnosis Additional Information: No value filed.    Anesthesia Type:   No value filed.     Note:  Airway :Nasal Cannula  Patient transferred to:PACU  Comments: VSS, no PONV, c/o \"burning pain\" in right leg. Medicated with additional Fentanyl 100mcg IV as ordered per ALEKSEY Ruiz MD. Report to Fariba DAVIS.      Vitals: (Last set prior to Anesthesia Care Transfer)    CRNA VITALS  4/12/2017 1034 - 4/12/2017 1112      4/12/2017             Pulse: 73    SpO2: 100 %    Resp Rate (observed): (!)  5    Resp Rate (set): 10                Electronically Signed By: ESTEFANI Srinivasan CRNA  April 12, 2017  11:12 AM  "

## 2017-04-14 DIAGNOSIS — R89.5 POSITIVE CULTURE FINDING: Primary | ICD-10-CM

## 2017-04-16 LAB
BACTERIA SPEC CULT: ABNORMAL
Lab: ABNORMAL
MICRO REPORT STATUS: ABNORMAL
MICROORGANISM SPEC CULT: ABNORMAL
SPECIMEN SOURCE: ABNORMAL

## 2017-04-17 ASSESSMENT — ENCOUNTER SYMPTOMS
HALLUCINATIONS: 0
STIFFNESS: 1
FATIGUE: 1
BLOOD IN STOOL: 0
COUGH: 1
HYPOTENSION: 0
EYE WATERING: 0
ABDOMINAL PAIN: 0
SINUS CONGESTION: 1
PANIC: 1
DYSPNEA ON EXERTION: 1
POLYDIPSIA: 1
INCREASED ENERGY: 1
NIGHT SWEATS: 1
DEPRESSION: 1
POOR WOUND HEALING: 0
WEAKNESS: 1
ALTERED TEMPERATURE REGULATION: 1
DISTURBANCES IN COORDINATION: 1
SLEEP DISTURBANCES DUE TO BREATHING: 0
BOWEL INCONTINENCE: 0
LOSS OF CONSCIOUSNESS: 0
HEMOPTYSIS: 0
WEIGHT GAIN: 1
NERVOUS/ANXIOUS: 1
MEMORY LOSS: 0
SWOLLEN GLANDS: 0
ARTHRALGIAS: 1
PALPITATIONS: 1
SPUTUM PRODUCTION: 0
EYE IRRITATION: 0
WEIGHT LOSS: 0
SEIZURES: 0
VOMITING: 0
FLANK PAIN: 0
CONSTIPATION: 0
SNORES LOUDLY: 1
DIFFICULTY URINATING: 0
DYSURIA: 0
BLOATING: 0
JOINT SWELLING: 1
EXERCISE INTOLERANCE: 0
DIZZINESS: 1
CHILLS: 1
POSTURAL DYSPNEA: 0
EYE PAIN: 0
BRUISES/BLEEDS EASILY: 0
NECK MASS: 0
MUSCLE WEAKNESS: 1
RESPIRATORY PAIN: 0
LIGHT-HEADEDNESS: 0
CLAUDICATION: 0
BACK PAIN: 0
HEADACHES: 0
TACHYCARDIA: 0
DOUBLE VISION: 0
WHEEZING: 1
TREMORS: 1
MYALGIAS: 0
HEARTBURN: 1
MUSCLE CRAMPS: 0
SHORTNESS OF BREATH: 0
SKIN CHANGES: 0
TASTE DISTURBANCE: 0
DIARRHEA: 0
TROUBLE SWALLOWING: 0
NAUSEA: 0
NECK PAIN: 0
RECTAL BLEEDING: 0
RECTAL PAIN: 0
LEG PAIN: 0
HYPERTENSION: 1
HEMATURIA: 0
TINGLING: 1
HOARSE VOICE: 0
FEVER: 0
PARALYSIS: 0
SYNCOPE: 0
EYE REDNESS: 0
SPEECH CHANGE: 1
POLYPHAGIA: 1
INSOMNIA: 0
NUMBNESS: 1
DECREASED CONCENTRATION: 0
COUGH DISTURBING SLEEP: 0
DECREASED APPETITE: 0
JAUNDICE: 0
LEG SWELLING: 1
NAIL CHANGES: 0
SINUS PAIN: 0
ORTHOPNEA: 0

## 2017-04-18 ENCOUNTER — TELEPHONE (OUTPATIENT)
Dept: ORTHOPEDICS | Facility: CLINIC | Age: 47
End: 2017-04-18

## 2017-04-18 NOTE — TELEPHONE ENCOUNTER
Left voicemail for patient to call back and confirm if it works for him to see us on the nurse schedule tomorrow at 1:30 for a wound check. He will be seeing infectious disease at 3:00 tomorrow per Dr. Aguilera's request as his cultures from surgery came back positive. If this works for patient, please schedule.

## 2017-04-19 ENCOUNTER — OFFICE VISIT (OUTPATIENT)
Dept: INFECTIOUS DISEASES | Facility: CLINIC | Age: 47
End: 2017-04-19
Attending: INTERNAL MEDICINE
Payer: COMMERCIAL

## 2017-04-19 ENCOUNTER — OFFICE VISIT (OUTPATIENT)
Dept: ORTHOPEDICS | Facility: CLINIC | Age: 47
End: 2017-04-19

## 2017-04-19 VITALS
BODY MASS INDEX: 33.03 KG/M2 | TEMPERATURE: 98.2 F | SYSTOLIC BLOOD PRESSURE: 126 MMHG | HEIGHT: 69 IN | WEIGHT: 223 LBS | DIASTOLIC BLOOD PRESSURE: 90 MMHG | HEART RATE: 98 BPM

## 2017-04-19 DIAGNOSIS — A49.02 MRSA INFECTION: ICD-10-CM

## 2017-04-19 DIAGNOSIS — M86.161 ACUTE OSTEOMYELITIS OF RIGHT TIBIA (H): Primary | ICD-10-CM

## 2017-04-19 DIAGNOSIS — T84.7XXA INFECTED HARDWARE IN RIGHT LEG, INITIAL ENCOUNTER (H): ICD-10-CM

## 2017-04-19 DIAGNOSIS — M86.161 ACUTE OSTEOMYELITIS OF RIGHT TIBIA (H): ICD-10-CM

## 2017-04-19 DIAGNOSIS — M86.661 CHRONIC OSTEOMYELITIS OF RIGHT TIBIA (H): ICD-10-CM

## 2017-04-19 DIAGNOSIS — S82.231K CLOSED DISP OBLIQUE FRACTURE OF SHAFT OF RIGHT TIBIA WITH NONUNION: ICD-10-CM

## 2017-04-19 DIAGNOSIS — R89.5 POSITIVE CULTURE FINDING: ICD-10-CM

## 2017-04-19 DIAGNOSIS — Z09 SURGICAL FOLLOW-UP CARE: Primary | ICD-10-CM

## 2017-04-19 LAB
ALBUMIN SERPL-MCNC: 3.3 G/DL (ref 3.4–5)
ALP SERPL-CCNC: 92 U/L (ref 40–150)
ALT SERPL W P-5'-P-CCNC: 41 U/L (ref 0–70)
ANION GAP SERPL CALCULATED.3IONS-SCNC: 6 MMOL/L (ref 3–14)
AST SERPL W P-5'-P-CCNC: 31 U/L (ref 0–45)
BACTERIA SPEC CULT: NORMAL
BASOPHILS # BLD AUTO: 0.1 10E9/L (ref 0–0.2)
BASOPHILS NFR BLD AUTO: 0.7 %
BILIRUB SERPL-MCNC: 0.6 MG/DL (ref 0.2–1.3)
BUN SERPL-MCNC: 6 MG/DL (ref 7–30)
CALCIUM SERPL-MCNC: 8.7 MG/DL (ref 8.5–10.1)
CHLORIDE SERPL-SCNC: 102 MMOL/L (ref 94–109)
CO2 SERPL-SCNC: 30 MMOL/L (ref 20–32)
CREAT SERPL-MCNC: 0.76 MG/DL (ref 0.66–1.25)
CRP SERPL-MCNC: 9.2 MG/L (ref 0–8)
DIFFERENTIAL METHOD BLD: NORMAL
EOSINOPHIL # BLD AUTO: 0.3 10E9/L (ref 0–0.7)
EOSINOPHIL NFR BLD AUTO: 2.9 %
ERYTHROCYTE [DISTWIDTH] IN BLOOD BY AUTOMATED COUNT: 13.5 % (ref 10–15)
GFR SERPL CREATININE-BSD FRML MDRD: ABNORMAL ML/MIN/1.7M2
GLUCOSE SERPL-MCNC: 88 MG/DL (ref 70–99)
HCT VFR BLD AUTO: 44.4 % (ref 40–53)
HGB BLD-MCNC: 14.7 G/DL (ref 13.3–17.7)
IMM GRANULOCYTES # BLD: 0.3 10E9/L (ref 0–0.4)
IMM GRANULOCYTES NFR BLD: 2.8 %
LYMPHOCYTES # BLD AUTO: 2.6 10E9/L (ref 0.8–5.3)
LYMPHOCYTES NFR BLD AUTO: 26.7 %
Lab: NORMAL
MCH RBC QN AUTO: 28.3 PG (ref 26.5–33)
MCHC RBC AUTO-ENTMCNC: 33.1 G/DL (ref 31.5–36.5)
MCV RBC AUTO: 86 FL (ref 78–100)
MICRO REPORT STATUS: NORMAL
MONOCYTES # BLD AUTO: 0.5 10E9/L (ref 0–1.3)
MONOCYTES NFR BLD AUTO: 5.1 %
NEUTROPHILS # BLD AUTO: 5.9 10E9/L (ref 1.6–8.3)
NEUTROPHILS NFR BLD AUTO: 61.8 %
NRBC # BLD AUTO: 0 10*3/UL
NRBC BLD AUTO-RTO: 0 /100
PLATELET # BLD AUTO: 189 10E9/L (ref 150–450)
POTASSIUM SERPL-SCNC: 4.1 MMOL/L (ref 3.4–5.3)
PROT SERPL-MCNC: 7 G/DL (ref 6.8–8.8)
RBC # BLD AUTO: 5.19 10E12/L (ref 4.4–5.9)
SODIUM SERPL-SCNC: 138 MMOL/L (ref 133–144)
SPECIMEN SOURCE: NORMAL
WBC # BLD AUTO: 9.6 10E9/L (ref 4–11)

## 2017-04-19 PROCEDURE — 36415 COLL VENOUS BLD VENIPUNCTURE: CPT | Performed by: INTERNAL MEDICINE

## 2017-04-19 PROCEDURE — 99212 OFFICE O/P EST SF 10 MIN: CPT | Mod: ZF

## 2017-04-19 PROCEDURE — 80053 COMPREHEN METABOLIC PANEL: CPT | Performed by: INTERNAL MEDICINE

## 2017-04-19 PROCEDURE — 86140 C-REACTIVE PROTEIN: CPT | Performed by: INTERNAL MEDICINE

## 2017-04-19 PROCEDURE — 85025 COMPLETE CBC W/AUTO DIFF WBC: CPT | Performed by: INTERNAL MEDICINE

## 2017-04-19 RX ORDER — DOXYCYCLINE HYCLATE 100 MG
100 TABLET ORAL 2 TIMES DAILY
Qty: 30 TABLET | Refills: 1 | Status: SHIPPED | OUTPATIENT
Start: 2017-04-19 | End: 2018-12-01

## 2017-04-19 RX ORDER — OXYCODONE HYDROCHLORIDE 5 MG/1
5-10 TABLET ORAL
Qty: 50 TABLET | Refills: 0 | Status: SHIPPED | OUTPATIENT
Start: 2017-04-19 | End: 2017-05-23

## 2017-04-19 RX ORDER — RIFAMPIN 300 MG/1
300 CAPSULE ORAL DAILY
Qty: 30 CAPSULE | Refills: 1 | Status: SHIPPED | OUTPATIENT
Start: 2017-04-19 | End: 2018-12-01

## 2017-04-19 ASSESSMENT — PAIN SCALES - GENERAL: PAINLEVEL: SEVERE PAIN (6)

## 2017-04-19 NOTE — MR AVS SNAPSHOT
After Visit Summary   4/19/2017    Malcolm Jesus    MRN: [id number]           Patient Information     Date Of Birth          1970        Visit Information        Provider Department      4/19/2017 3:00 PM Liu Hinson MD [facility] and Infectious Diseases        Today's Diagnoses     Acute osteomyelitis of right tibia (H)    -  1    Positive culture finding        Chronic osteomyelitis of right tibia (H)          Care Instructions    - Stop the ciprofloxacin  - Start rifampin 300 mg capsule twice daily, and doxycycline 100 mg by mouth twice daily  - Make sure you take with plenty of water. Keep in mind side effects that we discussed.  - Lab work today  - Follow up with me in 1 month        Follow-ups after your visit        Follow-up notes from your care team     Return in about 4 weeks (around 5/17/2017), or if symptoms worsen or fail to improve.      Your next 10 appointments already scheduled     Apr 19, 2017  4:00 PM CDT   Lab with  LAB   [facility] ([facility])    [address] [place] [address]  1st [facility] [address] [phone]            Apr 26, 2017  1:30 PM CDT   (Arrive by 1:15 PM)   Post-Op with [facility] Nurse   [facility] ([facility])    [address]  [address] [facility] [address] [phone]            May 16, 2017  1:10 PM CDT   (Arrive by 12:55 PM)   Return Visit with Les Aguilera MD   [facility] ([facility])    [address]  [address] [facility] [address] [phone]            May 24, 2017  4:00 PM CDT   (Arrive by 3:45 PM)   Return Visit with Liu Hinson MD   [facility] and Infectious Diseases ([facility])    [address] [place] [address]  3rd [facility] [address] [phone]              Future tests that were ordered for you today   "   Open Future Orders        Priority Expected Expires Ordered    CBC with platelets differential Routine 4/19/2017 4/19/2018 4/19/2017    Comprehensive metabolic panel Routine 4/19/2017 4/19/2018 4/19/2017    CRP inflammation Routine  4/19/2018 4/19/2017            Who to contact     If you have questions or need follow up information about today's clinic visit or your schedule please contact [facility] AND INFECTIOUS DISEASES directly at [phone].  Normal or non-critical lab and imaging results will be communicated to you by App Partner[employer], letter or phone within 4 business days after the clinic has received the results. If you do not hear from us within 7 days, please contact the clinic through J[employer] or phone. If you have a critical or abnormal lab result, we will notify you by phone as soon as possible.  Submit refill requests through DoublePositive or call your pharmacy and they will forward the refill request to us. Please allow 3 business days for your refill to be completed.          Additional Information About Your Visit        DoublePositive Information     DoublePositive gives you secure access to your electronic health record. If you see a primary care provider, you can also send messages to your care team and make appointments. If you have questions, please call your primary care clinic.  If you do not have a primary care provider, please call [phone] and they will assist you.        Care EveryWhere ID     This is your Care EveryWhere ID. This could be used by other organizations to access your [place] medical records  [id number]        Your Vitals Were     Pulse Temperature Height BMI (Body Mass Index)          98 98.2  F (36.8  C) (Oral) 1.753 m (5' 9\") 32.93 kg/m2         Blood Pressure from Last 3 Encounters:   04/19/17 126/90   04/12/17 122/85   11/30/13 123/71    Weight from Last 3 Encounters:   04/19/17 101.2 kg (223 lb)   01/31/17 101.4 kg (223 lb 8 oz)   04/14/15 102.1 kg (225 lb)            "      Today's Medication Changes          These changes are accurate as of: 4/19/17  3:29 PM.  If you have any questions, ask your nurse or doctor.               Start taking these medicines.        Dose/Directions    doxycycline 100 MG tablet   Commonly known as:  VIBRA-TABS   Used for:  Chronic osteomyelitis of right tibia (H)   Started by:  Liu Hinson MD        Dose:  100 mg   Take 1 tablet (100 mg) by mouth 2 times daily   Quantity:  30 tablet   Refills:  1       rifampin 300 MG capsule   Commonly known as:  RIFADIN   Used for:  Chronic osteomyelitis of right tibia (H)   Started by:  Liu Hinson MD        Dose:  300 mg   Take 1 capsule (300 mg) by mouth daily   Quantity:  30 capsule   Refills:  1            Where to get your medicines      These medications were sent to [facility] [address] [place] [address] [address]  [address] [place] [address] [address] [place] [address]    Hours:  TRANSPLANT PHONE NUMBER [phone] Phone:  [phone]     doxycycline 100 MG tablet    rifampin 300 MG capsule         Some of these will need a paper prescription and others can be bought over the counter.  Ask your nurse if you have questions.     Bring a paper prescription for each of these medications     oxyCODONE 5 MG IR tablet                Primary Care Provider Office Phone # Fax #    Ankit Phelan [phone]       [facility] [address] [facility] [address]        Thank you!     Thank you for choosing [facility] AND INFECTIOUS DISEASES  for your care. Our goal is always to provide you with excellent care. Hearing back from our patients is one way we can continue to improve our services. Please take a few minutes to complete the written survey that you may receive in the mail after your visit with us. Thank you!             Your Updated Medication List - Protect others around you: Learn how to safely use, store  and throw away your medicines at www.disposemymeds.org.          This list is accurate as of: 4/19/17  3:29 PM.  Always use your most recent med list.                   Brand Name Dispense Instructions for use    acetaminophen 325 MG tablet    TYLENOL    100 tablet    Take 2 tablets (650 mg) by mouth every 4 hours as needed for other (mild pain)       atenolol 50 MG tablet    TENORMIN     Take 50 mg by mouth every morning       CELEXA PO          ciprofloxacin 500 MG tablet    CIPRO    28 tablet    Take 1 tablet (500 mg) by mouth 2 times daily for 14 days       doxycycline 100 MG tablet    VIBRA-TABS    30 tablet    Take 1 tablet (100 mg) by mouth 2 times daily       hydrOXYzine 25 MG tablet    ATARAX    30 tablet    Take 1 tablet (25 mg) by mouth every 6 hours as needed for itching (and nausea)       ondansetron 4 MG ODT tab    ZOFRAN-ODT    4 tablet    Take 1-2 tablets (4-8 mg) by mouth every 8 hours as needed for nausea Dissolve ON the tongue.       oxyCODONE 5 MG IR tablet    ROXICODONE    50 tablet    Take 1-2 tablets (5-10 mg) by mouth every 3 hours as needed for pain or other (Moderate to Severe)       rifampin 300 MG capsule    RIFADIN    30 capsule    Take 1 capsule (300 mg) by mouth daily       senna-docusate 8.6-50 MG per tablet    SENOKOT-S;PERICOLACE    30 tablet    Take 1-2 tablets by mouth 2 times daily Take while on oral narcotics to prevent or treat constipation.       XANAX 1 MG tablet   Generic drug:  ALPRAZolam      Take 1 mg by mouth 2 times daily as needed

## 2017-04-19 NOTE — MR AVS SNAPSHOT
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           Follow-ups after your visit        Your next 10 appointments already scheduled     Apr 19, 2017  3:00 PM CDT   (Arrive by 2:45 PM)   New Patient Visit with Liu Hinson MD   [facility] and Infectious Diseases ([facility] [place])    [address]  3rd Floor  [facility] [id number] [phone]            Apr 26, 2017  1:30 PM CDT   (Arrive by 1:15 PM)   Post-Op with [facility] Nurse   [facility] ([facility])    [address]  4th Floor  [facility] [address] [phone]            May 16, 2017  1:10 PM CDT   (Arrive by 12:55 PM)   Return Visit with Les Aguilera MD   [facility] ([facility] [place])    [address] [place] [address]  4th Floor  [facility] [address] [phone]              Who to contact     Please call your clinic at [phone] to:    Ask questions about your health    Make or cancel appointments    Discuss your medicines    Learn about your test results    Speak to your doctor   If you have compliments or concerns about an experience at your clinic, or if you wish to file a complaint, please contact [facility] Patient Relations at [phone] or email us at [email or web address]         Additional Information About Your Visit        MyChart Information     Metran[employer] gives you secure access to your electronic health record. If you see a primary care provider, you can also send messages to your care team and  make appointments. If you have questions, please call your primary care clinic.  If you do not have a primary care provider, please call [phone] and they will assist you.      [employer] is an electronic gateway that provides easy, online access to your medical records. With [employer], you can request a clinic appointment, read your test results, renew a prescription or communicate with your care team.     To access your existing account, please contact your [facility] or call [phone] for assistance.        Care EveryWhere ID     This is your Care EveryWhere ID. This could be used by other organizations to access your [place] medical records  [id number]         Blood Pressure from Last 3 Encounters:   04/12/17 122/85   11/30/13 123/71   06/30/13 133/79    Weight from Last 3 Encounters:   01/31/17 101.4 kg (223 lb 8 oz)   04/14/15 102.1 kg (225 lb)   03/31/15 104.3 kg (230 lb)              Today, you had the following     No orders found for display         Where to get your medicines      Some of these will need a paper prescription and others can be bought over the counter.  Ask your nurse if you have questions.     Bring a paper prescription for each of these medications     oxyCODONE 5 MG IR tablet          Primary Care Provider Office Phone # Fax #    Ankit Phelan [phone]       [facility] [address] [facility] [address]        Thank you!     Thank you for choosing [facility]  for your care. Our goal is always to provide you with excellent care. Hearing back from our patients is one way we can continue to improve our services. Please take a few minutes to complete the written survey that you may receive in the mail after your visit with us. Thank you!             Your Updated Medication List - Protect others around you: Learn how to safely use, store and throw away your medicines at  www.disposemymeds.org.          This list is accurate as of: 4/19/17  1:38 PM.  Always use your most recent med list.                   Brand Name Dispense Instructions for use    acetaminophen 325 MG tablet    TYLENOL    100 tablet    Take 2 tablets (650 mg) by mouth every 4 hours as needed for other (mild pain)       atenolol 50 MG tablet    TENORMIN     Take 50 mg by mouth every morning       CELEXA PO          ciprofloxacin 500 MG tablet    CIPRO    28 tablet    Take 1 tablet (500 mg) by mouth 2 times daily for 14 days       hydrOXYzine 25 MG tablet    ATARAX    30 tablet    Take 1 tablet (25 mg) by mouth every 6 hours as needed for itching (and nausea)       ondansetron 4 MG ODT tab    ZOFRAN-ODT    4 tablet    Take 1-2 tablets (4-8 mg) by mouth every 8 hours as needed for nausea Dissolve ON the tongue.       oxyCODONE 5 MG IR tablet    ROXICODONE    50 tablet    Take 1-2 tablets (5-10 mg) by mouth every 3 hours as needed for pain or other (Moderate to Severe)       senna-docusate 8.6-50 MG per tablet    SENOKOT-S;PERICOLACE    30 tablet    Take 1-2 tablets by mouth 2 times daily Take while on oral narcotics to prevent or treat constipation.       XANAX 1 MG tablet   Generic drug:  ALPRAZolam      Take 1 mg by mouth 2 times daily as needed

## 2017-04-19 NOTE — NURSING NOTE
"Chief Complaint   Patient presents with     New Patient     Staph right tibia       Initial /90  Pulse 98  Temp 98.2  F (36.8  C) (Oral)  Ht 1.753 m (5' 9\")  Wt 101.2 kg (223 lb)  BMI 32.93 kg/m2 Estimated body mass index is 32.93 kg/(m^2) as calculated from the following:    Height as of this encounter: 1.753 m (5' 9\").    Weight as of this encounter: 101.2 kg (223 lb).  Medication Reconciliation: complete  "

## 2017-04-19 NOTE — PATIENT INSTRUCTIONS
- Stop the ciprofloxacin  - Start rifampin 300 mg capsule twice daily, and doxycycline 100 mg by mouth twice daily  - Make sure you take with plenty of water. Keep in mind side effects that we discussed.  - Lab work today  - Follow up with me in 1 month

## 2017-04-19 NOTE — PROGRESS NOTES
Reason for visit:    Malcolm Jesus came in to the clinic for a one week post op check.    His surgery was done 4/12/17 by Dr Aguilera.  He had Right distal tibia hardware removal and irrigation and debridement     Assessment:    Malcolm came into the clinic in tall cam walker WBAT    The Surgical wounds were exposed and found to be improved but not sufficiently healed; so the sutures were not removed. Patient stated that he has been taking his pain meds as prescribed and that they are adequately controlling his pain but he has just a few left. He is encouraged to continue elevating and begin icing as he states he hasn't been icing. He has quite a bit of swelling to his foot and leg, he is instructed to ice for 20 minutes on and 40 minutes off, continuing this throughout the day for the next week.  He was given a refill of his pain meds today.       Plan:     He was placed in a dry dressing.  He was told to keep his dressing clean, dry and intact and to remain WBAT but to limit his weightbearing until seen in clinic next week for re-evaluation.  He was also encouraged to quit smoking, he states he has only had 1-2 cigarettes in the last week but again he is educated on the importance of smoking cessation. He verbalized understanding and stated that he will continue to try to quit.  He has an appointment today with infectious disease for further evaluation of his positive wound cultures.      He will return to clinic next Wednesday for a wound check and possible suture removal.        He has an appointment to see Dr. Aguilera in 5 weeks, at that time Dr. Aguilera will determine further restrictions.    He has our phone number and will call with questions or problems.

## 2017-04-19 NOTE — LETTER
4/19/2017      RE: Malcolm Jesus  [address]  [facility] [address]       Infectious Disease Clinic Staff Note: Mr. Jesus was seen, examined, and the case was discussed with Dr. Hinson, ID Fellow -- I agree with his consultative patient history and examination, assessment and plan in this outpatient ID Consult note. The note reflects my observations and opinions, and the plan outlined fully reflects my approach. I have reviewed the available history, laboratory results, and radiographic and other reports with the Fellow.    Progress Note    HISTORY OF PRESENT ILLNESS:  The patient is a 46-year-old male who sustained a tib-fib fracture in 2012, status post ORIF and hardware replacement, recurrent cellulitis over the right distal leg, and recent surgery with hardware removal, presenting to me with a new positive culture.  The patient had an episode of cellulitis in early January when he presented with right leg pain and drainage. Inflammatory markers and x-rays were unremarkable.  He was sent Orthopedic Surgery for management.  On 01/31/2017 he underwent a CT scan which showed nonunion of the distal tibial, with complex hardware failure at the distal tibia.  Orthopedics recommended hardware removal and I&D which he underwent on 04/12/2017.  Per my review of the surgery, all hardware was extracted except broken bones within the tibia.  Per the patient, there were additional some screws that were left behind as well.  There was no gross purulence, but the tissue did not appear to be healed.  Wound closure was difficult because the plate was abutting the skin.  Aggressive debridement was performed.  He was placed on ciprofloxacin which he has continued since the surgery.  He says that since the surgery overall he has been doing well.  The leg remains wrapped and in a brace.  He followed up with Orthopedic Surgery today and will be seeing them also in 1 week.  He denies fevers, chills, sweats.  He is unsure  if there has been any drainage.  He is tolerating the ciprofloxacin fine thus far.      PAST MEDICAL HISTORY:   1.  Tib-fib fracture in 2012, status post ORIF and hardware placement.   2.  Recurrent cellulitis.   3.  Hypertension.   4.  Anxiety.   5.  Basal cell carcinoma.      MEDICATIONS:   1.  Tylenol 325 tablet daily.   2.  Ciprofloxacin 500 mg tablet daily.   3.  Atarax 25 mg tablet daily.   4.  Zofran p.r.n.   5.  Oxycodone 5 mg IR tablet p.r.n.   6.  Senokot p.r.n.   7.  Alprazolam 1 mg tablet daily.   8.  Atenolol 50 mg tablets.   9.  Citalopram.        SOCIAL HISTORY and Family History:  The patient lives in [place] alone.  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  He continues to work intermittently but has been limited by his injury.  He smokes 1 pack per day for the last 20 years.  He is currently trying to quit.  No alcohol use.  No illicit drug use.      REVIEW OF SYSTEMS:  A 12-point review of systems negative, aside from that listed above.      PHYSICAL EXAMINATION:   GENERAL:  The patient appears well.  Comfortable.  Appears stated age.   HEENT:  Sclerae are anicteric.  Oral mucosa moist without lesions.   CARDIOVASCULAR:  Regular rhythm.  Normal rate.  No additional heart sounds or murmurs.   CHEST:  Lungs clear to auscultation bilaterally.   ABDOMEN:  Bowel sounds present.  Nontender to palpation.   MUSCULOSKELETAL:  Right leg in brace.  This was not undone.      LABORATORY WORK:  Reviewed.   Fluid right distal tibia positive for MRSA, resistant only to penicillin and erythromycin.      IMAGING:  CT scan of the leg reviewed.      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  It appears that most of the hardware  was removed.  However, per the patient, there were a couple of screws left behind.  The patient has tibial osteomyelitis and the hardware could be a nidus for infection moving forward.  In addition to debridement, antibiotics are needed (targeting MRSA) in order to cure this infection.  Fortunately, we have options given the susceptibility profile.  I will stop the ciprofloxacin today and start doxycycline and p.o. rifampin given the chronicity of this infection and presence of screws that may be in the area of infection.  The plan will be to treat for a total of at least 8 weeks, potentially 3 months depending on his course.  Will follow inflammatory markers to monitor his treatment.      PLAN:   1.  CRP, CBC with diff, CMP today.   2.  Stop ciprofloxacin.   3.  Start doxycycline p.o. 100 mg b.i.d., Rifampin 300 mg capsule p.o. b.i.d.   4.  Discussed side effects of the medications with the patient.   5.  Anticipate 8 weeks to 3 months of therapy.   6.  Follow up within 1 month.      The patient was seen and discussed with Dr. Church.   Dictated by Ann Dunham MD, Resident         GIOVANNY CHURCH MD       As dictated by ANN DUNHAM MD            D: 2017 17:04   T: 2017 05:44   MT: LEÓN      Name:     KAILASH TAVAREZ   MRN:      [id number]        Account:      [id number]   :      1970           Service Date: 2017      Document: [id number]

## 2017-04-20 NOTE — PROGRESS NOTES
Progress Note    HISTORY OF PRESENT ILLNESS:  The patient is a 46-year-old male who sustained a tib-fib fracture in 2012, status post ORIF and hardware replacement, recurrent cellulitis over the right distal leg, and recent surgery with hardware removal, presenting to me with a new positive culture.  The patient had an episode of cellulitis in early January when he presented with right leg pain and drainage. Inflammatory markers and x-rays were unremarkable.  He was sent Orthopedic Surgery for management.  On 01/31/2017 he underwent a CT scan which showed nonunion of the distal tibial, with complex hardware failure at the distal tibia.  Orthopedics recommended hardware removal and I&D which he underwent on 04/12/2017.  Per my review of the surgery, all hardware was extracted except broken bones within the tibia.  Per the patient, there were additional some screws that were left behind as well.  There was no gross purulence, but the tissue did not appear to be healed.  Wound closure was difficult because the plate was abutting the skin.  Aggressive debridement was performed.  He was placed on ciprofloxacin which he has continued since the surgery.  He says that since the surgery overall he has been doing well.  The leg remains wrapped and in a brace.  He followed up with Orthopedic Surgery today and will be seeing them also in 1 week.  He denies fevers, chills, sweats.  He is unsure if there has been any drainage.  He is tolerating the ciprofloxacin fine thus far.      PAST MEDICAL HISTORY:   1.  Tib-fib fracture in 2012, status post ORIF and hardware placement.   2.  Recurrent cellulitis.   3.  Hypertension.   4.  Anxiety.   5.  Basal cell carcinoma.      MEDICATIONS:   1.  Tylenol 325 tablet daily.   2.  Ciprofloxacin 500 mg tablet daily.   3.  Atarax 25 mg tablet daily.   4.  Zofran p.r.n.   5.  Oxycodone 5 mg IR tablet p.r.n.   6.  Senokot p.r.n.   7.  Alprazolam 1 mg tablet daily.   8.  Atenolol 50 mg tablets.    9.  Citalopram.        SOCIAL HISTORY and Family History:  The patient lives in [place] alone.  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  He continues to work intermittently but has been limited by his injury.  He smokes 1 pack per day for the last 20 years.  He is currently trying to quit.  No alcohol use.  No illicit drug use.      REVIEW OF SYSTEMS:  A 12-point review of systems negative, aside from that listed above.      PHYSICAL EXAMINATION:   GENERAL:  The patient appears well.  Comfortable.  Appears stated age.   HEENT:  Sclerae are anicteric.  Oral mucosa moist without lesions.   CARDIOVASCULAR:  Regular rhythm.  Normal rate.  No additional heart sounds or murmurs.   CHEST:  Lungs clear to auscultation bilaterally.   ABDOMEN:  Bowel sounds present.  Nontender to palpation.   MUSCULOSKELETAL:  Right leg in brace.  This was not undone.      LABORATORY WORK:  Reviewed.   Fluid right distal tibia positive for MRSA, resistant only to penicillin and erythromycin.      IMAGING:  CT scan of the leg reviewed.      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  It appears that most of the hardware was removed.  However, per the patient, there were a couple of screws left behind.  The patient has tibial osteomyelitis and the hardware could be a nidus for infection moving forward.  In addition to debridement, antibiotics are needed (targeting MRSA) in order to cure this infection.  Fortunately, we have options given the susceptibility profile.  I will stop the ciprofloxacin today and start doxycycline and p.o. rifampin given the chronicity of this infection and presence of screws that may be in the area of infection.  The plan will be  to treat for a total of at least 8 weeks, potentially 3 months depending on his course.  Will follow inflammatory markers to monitor his treatment.      PLAN:   1.  CRP, CBC with diff, CMP today.   2.  Stop ciprofloxacin.   3.  Start doxycycline p.o. 100 mg b.i.d., Rifampin 300 mg capsule p.o. b.i.d.   4.  Discussed side effects of the medications with the patient.   5.  Anticipate 8 weeks to 3 months of therapy.   6.  Follow up within 1 month.      The patient was seen and discussed with Dr. Church.   Dictated by Ann Dunham MD, Resident         GIOVANNY CHURCH MD       As dictated by ANN DUNHAM MD            D: 2017 17:04   T: 2017 05:44   MT: LEÓN      Name:     KAILASH TAVAREZ   MRN:      [id number]        Account:      [id number]   :      1970           Service Date: 2017      Document: [id number]

## 2017-04-25 NOTE — PROGRESS NOTES
Infectious Disease Clinic Staff Note: Mr. Jesus was seen, examined, and the case was discussed with Dr. Hinson, ID Fellow -- I agree with his consultative patient history and examination, assessment and plan in this outpatient ID Consult note. The note reflects my observations and opinions, and the plan outlined fully reflects my approach. I have reviewed the available history, laboratory results, and radiographic and other reports with the Fellow.

## 2017-04-26 ENCOUNTER — OFFICE VISIT (OUTPATIENT)
Dept: ORTHOPEDICS | Facility: CLINIC | Age: 47
End: 2017-04-26

## 2017-04-26 DIAGNOSIS — Z09 SURGICAL FOLLOW-UP CARE: Primary | ICD-10-CM

## 2017-04-26 RX ORDER — HYDROCODONE BITARTRATE AND ACETAMINOPHEN 5; 325 MG/1; MG/1
1-2 TABLET ORAL EVERY 8 HOURS PRN
Qty: 40 TABLET | Refills: 0 | Status: SHIPPED | OUTPATIENT
Start: 2017-04-26 | End: 2017-05-10

## 2017-04-26 NOTE — MR AVS SNAPSHOT
After Visit Summary   4/26/2017    Malcolm Jesus    MRN: [id number]           Patient Information     Date Of Birth          1970        Visit Information        Provider Department      4/26/2017 1:30 PM Nurse, [facility]        Today's Diagnoses     Surgical follow-up care    -  1       Follow-ups after your visit        Your next 10 appointments already scheduled     May 16, 2017  1:10 PM CDT   (Arrive by 12:55 PM)   Return Visit with Les Aguilera MD   [facility] ([facility])    [address] [place] [address]  4th [facility] [address] [phone]            May 24, 2017  4:00 PM CDT   (Arrive by 3:45 PM)   Return Visit with Liu Hinson MD   [facility] and Infectious Diseases ([facility])    [address] [place] [address] [phone]              Who to contact     Please call your clinic at [phone] to:    Ask questions about your health    Make or cancel appointments    Discuss your medicines    Learn about your test results    Speak to your doctor   If you have compliments or concerns about an experience at your clinic, or if you wish to file a complaint, please contact [facility] Patient Relations at [phone] or email us at [email or web address]         Additional Information About Your Visit        MyChart Information     [employer] gives you secure access to your electronic health record. If you see a primary care provider, you can also send messages to your care team and make appointments. If you have questions, please call your primary care clinic.  If you do not have a primary care provider, please call [phone] and they will assist you.      [employer] is an electronic gateway that provides easy, online access to your medical records. With [employer], you can request a clinic  appointment, read your test results, renew a prescription or communicate with your care team.     To access your existing account, please contact your [facility] or call [phone] for assistance.        Care EveryWhere ID     This is your Care EveryWhere ID. This could be used by other organizations to access your [place] medical records  [id number]         Blood Pressure from Last 3 Encounters:   04/19/17 126/90   04/12/17 122/85   11/30/13 123/71    Weight from Last 3 Encounters:   04/19/17 101.2 kg (223 lb)   01/31/17 101.4 kg (223 lb 8 oz)   04/14/15 102.1 kg (225 lb)              Today, you had the following     No orders found for display         Today's Medication Changes          These changes are accurate as of: 4/26/17  1:45 PM.  If you have any questions, ask your nurse or doctor.               Start taking these medicines.        Dose/Directions    HYDROcodone-acetaminophen 5-325 MG per tablet   Commonly known as:  NORCO   Used for:  Surgical follow-up care        Dose:  1-2 tablet   Take 1-2 tablets by mouth every 8 hours as needed for moderate to severe pain   Quantity:  40 tablet   Refills:  0            Where to get your medicines      Some of these will need a paper prescription and others can be bought over the counter.  Ask your nurse if you have questions.     Bring a paper prescription for each of these medications     HYDROcodone-acetaminophen 5-325 MG per tablet                Primary Care Provider Office Phone # Fax #    Ankit Phelan [phone]       [facility] [address] [facility] [address]        Thank you!     Thank you for choosing [facility] ORTH[facility]  for your care. Our goal is always to provide you with excellent care. Hearing back from our patients is one way we can continue to improve our services. Please take a few minutes to complete the written survey that you may receive in the mail  after your visit with us. Thank you!             Your Updated Medication List - Protect others around you: Learn how to safely use, store and throw away your medicines at www.disposemymeds.org.          This list is accurate as of: 4/26/17  1:45 PM.  Always use your most recent med list.                   Brand Name Dispense Instructions for use    acetaminophen 325 MG tablet    TYLENOL    100 tablet    Take 2 tablets (650 mg) by mouth every 4 hours as needed for other (mild pain)       atenolol 50 MG tablet    TENORMIN     Take 50 mg by mouth every morning       CELEXA PO          ciprofloxacin 500 MG tablet    CIPRO    28 tablet    Take 1 tablet (500 mg) by mouth 2 times daily for 14 days       doxycycline 100 MG tablet    VIBRA-TABS    30 tablet    Take 1 tablet (100 mg) by mouth 2 times daily       HYDROcodone-acetaminophen 5-325 MG per tablet    NORCO    40 tablet    Take 1-2 tablets by mouth every 8 hours as needed for moderate to severe pain       hydrOXYzine 25 MG tablet    ATARAX    30 tablet    Take 1 tablet (25 mg) by mouth every 6 hours as needed for itching (and nausea)       ondansetron 4 MG ODT tab    ZOFRAN-ODT    4 tablet    Take 1-2 tablets (4-8 mg) by mouth every 8 hours as needed for nausea Dissolve ON the tongue.       oxyCODONE 5 MG IR tablet    ROXICODONE    50 tablet    Take 1-2 tablets (5-10 mg) by mouth every 3 hours as needed for pain or other (Moderate to Severe)       rifampin 300 MG capsule    RIFADIN    30 capsule    Take 1 capsule (300 mg) by mouth daily       senna-docusate 8.6-50 MG per tablet    SENOKOT-S;PERICOLACE    30 tablet    Take 1-2 tablets by mouth 2 times daily Take while on oral narcotics to prevent or treat constipation.       XANAX 1 MG tablet   Generic drug:  ALPRAZolam      Take 1 mg by mouth 2 times daily as needed

## 2017-04-26 NOTE — PROGRESS NOTES
Reason for visit:    Malcolm Jesus came in to the clinic for a two week post op check.    His surgery was done 4/12/17 by Dr Aguilera.  He had Right distal tibia hardware removal and irrigation and debridement.       Assessment:    Malcolm came into the clinic in [facility] walker WBAT    The Surgical wounds were exposed and found to be well-healed and improved; so the sutures were removed.    He has small amount of drainage to the proximal aspect of the anterior tibia incision, he was given supplies to perform daily dry dressing changes and will send a picture next week with an update on how it is healing.  He stated that the antibiotics he is taking that infectious disease have prescribed are making him feel very sick, he states he has been nauseous, having night sweats and making him feel dizzy.  He stated he stopped taking the antibiotic yesterday due to these symptoms. He is going down to 3rd floor to talk with infectious disease following this appointment.     He stated that his pain is starting to get better, He is taking 2 tablets of oxycodone every 8 hours.  He was stepped down to hydrocodone today and is instructed on continued weaning of narcotic medication.       Plan:     He was placed in tall cam walker.  He was told to remain WBAT     He has an appointment to see Dr. Aguilera at that time Dr. Aguilera will determine further restrictions.    He has our phone number and will call with questions or problems.    Answers for HPI/ROS submitted by the patient on 4/17/2017   General Symptoms: Yes  Skin Symptoms: Yes  HENT Symptoms: Yes  EYE SYMPTOMS: Yes  HEART SYMPTOMS: Yes  LUNG SYMPTOMS: Yes  INTESTINAL SYMPTOMS: Yes  URINARY SYMPTOMS: Yes  REPRODUCTIVE SYMPTOMS: No  SKELETAL SYMPTOMS: Yes  BLOOD SYMPTOMS: Yes  NERVOUS SYSTEM SYMPTOMS: Yes  MENTAL HEALTH SYMPTOMS: Yes  Fever: No  Loss of appetite: No  Weight loss: No  Weight gain: Yes  Fatigue: Yes  Night sweats: Yes  Chills: Yes  Increased stress: Yes  Excessive  hunger: Yes  Excessive thirst: Yes  Feeling hot or cold when others believe the temperature is normal: Yes  Loss of height: No  Post-operative complications: No  Surgical site pain: Yes  Hallucinations: No  Change in or Loss of Energy: Yes  Hyperactivity: No  Confusion: No  Changes in hair: No  Changes in moles/birth marks: No  Itching: No  Rashes: No  Changes in nails: No  Acne: No  Change in facial hair: No  Warts: No  Non-healing sores: No  Scarring: Yes  Flaking of skin: Yes  Color changes of hands/feet in cold : No  Sun sensitivity: No  Skin thickening: No  Ear pain: No  Ear discharge: No  Hearing loss: No  Tinnitus: No  Nosebleeds: No  Congestion: Yes  Sinus pain: No  Trouble swallowing: No   Voice hoarseness: No  Mouth sores: No  Tooth pain: Yes  Gum tenderness: Yes  Bleeding gums: No  Change in taste: No  Hearing aid used: No  Neck lump: No  Eye pain: No  Vision loss: No  Dry eyes: No  Watery eyes: No  Eye bulging: No  Double vision: No  Flashing of lights: No  Spots: No  Floaters: No  Redness: No  Crossed eyes: No  Tunnel Vision: No  Yellowing of eyes: No  Eye irritation: No  Cough: Yes  Sputum or phlegm: No  Coughing up blood: No  Difficulty breating or shortness of breath: No  Snoring: Yes  Wheezing: Yes  Difficulty breathing on exertion: Yes  Respiratory pain: No  Nighttime Cough: No  Difficulty breathing when lying flat: No  Chest pain or pressure: No  Fast or irregular heartbeat: Yes  Pain in legs with walking: No  Swelling in feet or ankles: Yes  Trouble breathing while lying down: No  Fingers or Toes appear blue: No  High blood pressure: Yes  Low blood pressure: No  Fainting: No  Murmurs: No  Chest pain on exertion: No  Chest pain at rest: No  Cramping pain in leg during exercise: No  Pacemaker: No  Edema or swelling: No  Fast heart beat: No  Wake up at night with shortness of breath: No  Heart flutters: No  Light-headedness: No  Exercise intolerance: No  Heart burn or indigestion: Yes  Nausea:  No  Vomiting: No  Abdominal pain: No  Bloating: No  Constipation: No  Diarrhea: No  Blood in stool: No  Black stools: No  Rectal or Anal pain: No  Fecal incontinence: No  Rectal bleeding: No  Yellowing of skin or eyes: No  Vomit with blood: No  Hemorrhoids: No  Trouble holding urine or incontinence: No  Pain or burning: No  Trouble starting or stopping: No  Increased frequency of urination: No  Blood in urine: No  Decreased frequency of urination: No  Frequent nighttime urination: No  Flank pain: No  Difficulty emptying bladder: No  Back pain: No  Muscle aches: No  Neck pain: No  Swollen joints: Yes  Joint pain: Yes  Bone pain: Yes  Muscle cramps: No  Muscle weakness: Yes  Joint stiffness: Yes  Bone fracture: Yes  Anemia: No  Swollen glands: No  Easy bleeding or bruising: No  Trouble with coordination: Yes  Dizziness or trouble with balance: Yes  Fainting or black-out spells: No  Memory loss: No  Headache: No  Seizures: No  Speech problems: Yes  Tingling: Yes  Tremor: Yes  Weakness: Yes  Difficulty walking: Yes  Paralysis: No  Numbness: Yes  Nervous or Anxious: Yes  Depression: Yes  Trouble sleeping: No  Trouble thinking or concentrating: No  Mood changes: Yes  Panic attacks: Yes

## 2017-05-10 DIAGNOSIS — M25.571 PAIN IN JOINT, ANKLE AND FOOT, RIGHT: Primary | ICD-10-CM

## 2017-05-10 DIAGNOSIS — Z09 SURGICAL FOLLOW-UP CARE: ICD-10-CM

## 2017-05-10 LAB
FUNGUS SPEC CULT: NORMAL
MICRO REPORT STATUS: NORMAL
SPECIMEN SOURCE: NORMAL

## 2017-05-10 RX ORDER — HYDROCODONE BITARTRATE AND ACETAMINOPHEN 5; 325 MG/1; MG/1
1-2 TABLET ORAL EVERY 8 HOURS PRN
Qty: 40 TABLET | Refills: 0 | Status: SHIPPED | OUTPATIENT
Start: 2017-05-10 | End: 2017-05-23

## 2017-05-16 ENCOUNTER — OFFICE VISIT (OUTPATIENT)
Dept: ORTHOPEDICS | Facility: CLINIC | Age: 47
End: 2017-05-16

## 2017-05-16 VITALS — BODY MASS INDEX: 33.2 KG/M2 | WEIGHT: 224.17 LBS | HEIGHT: 69 IN

## 2017-05-16 DIAGNOSIS — M25.571 PAIN IN JOINT, ANKLE AND FOOT, RIGHT: Primary | ICD-10-CM

## 2017-05-16 NOTE — PROGRESS NOTES
CHIEF COMPLAINT:  Status post right tibia hardware removal with I&D performed on 04/12/2017.      HISTORY OF PRESENT ILLNESS:  Mr. Jesus presents today for further followup.  Denies to have any problems or complaints.  In fact, he reports to be doing quite well.  Reports to be feeling better than ever.  Reports to be making progress every week.        He continues being on antibiotic therapy per ID recommendations.      PHYSICAL EXAMINATION:  On today's visit, he presents with a well-healed surgical incision.  CMS intact.  Skin intact.  There is minimum swelling across the lower leg and in fact, it looks better than it has ever looked.      RADIOGRAPHIC EVALUATION:  Plain x-rays were obtained today which were significant for showing a fair amount of bone consolidation across the tib-fib with still a varus alignment.      ASSESSMENT:  Status post right lower leg I&D with hardware removal.      PLAN:  I discussed with the patient he is making excellent progress.  He is going to follow up on a p.r.n. basis with us and he will continue having follow up with ID.  We confirmed today he has a followup appointment.      All questions were answered.      TT 15 minutes, CT 10 minutes.

## 2017-05-16 NOTE — MR AVS SNAPSHOT
After Visit Summary   5/16/2017    Malcolm Jesus    MRN: [id number]           Patient Information     Date Of Birth          1970        Visit Information        Provider Department      5/16/2017 1:10 PM Les Aguilera MD [facility]        Today's Diagnoses     Pain in joint, ankle and foot, right    -  1       Follow-ups after your visit        Follow-up notes from your care team     Return if symptoms worsen or fail to improve.      Your next 10 appointments already scheduled     May 24, 2017  4:00 PM CDT   (Arrive by 3:45 PM)   Return Visit with Liu Hinson MD   [facility] and Infectious Diseases ([facility])    [address] [place] [address]  3rd [facility] [address] [phone]              Who to contact     Please call your clinic at [phone] to:    Ask questions about your health    Make or cancel appointments    Discuss your medicines    Learn about your test results    Speak to your doctor   If you have compliments or concerns about an experience at your clinic, or if you wish to file a complaint, please contact [facility] Patient Relations at [phone] or email us at [email or web address]         Additional Information About Your Visit        MyChart Information     [employer] gives you secure access to your electronic health record. If you see a primary care provider, you can also send messages to your care team and make appointments. If you have questions, please call your primary care clinic.  If you do not have a primary care provider, please call [phone] and they will assist you.      [employer] is an electronic gateway that provides easy, online access to your medical records. With [employer], you can request a clinic appointment, read your test results, renew a prescription or communicate with your care team.     To access your existing account, please  "contact your [facility] or call [phone] for assistance.        Care EveryWhere ID     This is your Care EveryWhere ID. This could be used by other organizations to access your [place] medical records  [id number]        Your Vitals Were     Height BMI (Body Mass Index)                1.753 m (5' 9\") 33.1 kg/m2           Blood Pressure from Last 3 Encounters:   04/19/17 126/90   04/12/17 122/85   11/30/13 123/71    Weight from Last 3 Encounters:   05/16/17 101.7 kg (224 lb 2.7 oz)   04/19/17 101.2 kg (223 lb)   01/31/17 101.4 kg (223 lb 8 oz)              Today, you had the following     No orders found for display       Primary Care Provider Office Phone # Fax #    Ankit Phelan [phone]       [facility] [address] [facility] [address]        Thank you!     Thank you for choosing [facility]TH[facility]  for your care. Our goal is always to provide you with excellent care. Hearing back from our patients is one way we can continue to improve our services. Please take a few minutes to complete the written survey that you may receive in the mail after your visit with us. Thank you!             Your Updated Medication List - Protect others around you: Learn how to safely use, store and throw away your medicines at www.disposemymeds.org.          This list is accurate as of: 5/16/17 11:59 PM.  Always use your most recent med list.                   Brand Name Dispense Instructions for use    acetaminophen 325 MG tablet    TYLENOL    100 tablet    Take 2 tablets (650 mg) by mouth every 4 hours as needed for other (mild pain)       atenolol 50 MG tablet    TENORMIN     Take 50 mg by mouth every morning       CELEXA PO          doxycycline 100 MG tablet    VIBRA-TABS    30 tablet    Take 1 tablet (100 mg) by mouth 2 times daily       HYDROcodone-acetaminophen 5-325 MG per tablet    NORCO    40 tablet    Take 1-2 tablets by mouth " every 8 hours as needed for moderate to severe pain       hydrOXYzine 25 MG tablet    ATARAX    30 tablet    Take 1 tablet (25 mg) by mouth every 6 hours as needed for itching (and nausea)       ondansetron 4 MG ODT tab    ZOFRAN-ODT    4 tablet    Take 1-2 tablets (4-8 mg) by mouth every 8 hours as needed for nausea Dissolve ON the tongue.       oxyCODONE 5 MG IR tablet    ROXICODONE    50 tablet    Take 1-2 tablets (5-10 mg) by mouth every 3 hours as needed for pain or other (Moderate to Severe)       rifampin 300 MG capsule    RIFADIN    30 capsule    Take 1 capsule (300 mg) by mouth daily       senna-docusate 8.6-50 MG per tablet    SENOKOT-S;PERICOLACE    30 tablet    Take 1-2 tablets by mouth 2 times daily Take while on oral narcotics to prevent or treat constipation.       XANAX 1 MG tablet   Generic drug:  ALPRAZolam      Take 1 mg by mouth 2 times daily as needed

## 2017-05-16 NOTE — LETTER
5/16/2017    RE: Malcolm Jesus  [email or web address]  [facility] [place] [address]     Dear Colleague,    Thank you for referring your patient, Malcolm Jesus, to the [facility] ORTHOPAEDIC [facility]INIC at [facility]. Please see a copy of my visit note below.    CHIEF COMPLAINT:  Status post right tibia hardware removal with I&D performed on 04/12/2017.      HISTORY OF PRESENT ILLNESS:  Mr. Jesus presents today for further followup.  Denies to have any problems or complaints.  In fact, he reports to be doing quite well.  Reports to be feeling better than ever.  Reports to be making progress every week.        He continues being on antibiotic therapy per ID recommendations.      PHYSICAL EXAMINATION:  On today's visit, he presents with a well-healed surgical incision.  CMS intact.  Skin intact.  There is minimum swelling across the lower leg and in fact, it looks better than it has ever looked.      RADIOGRAPHIC EVALUATION:  Plain x-rays were obtained today which were significant for showing a fair amount of bone consolidation across the tib-fib with still a varus alignment.      ASSESSMENT:  Status post right lower leg I&D with hardware removal.      PLAN:  I discussed with the patient he is making excellent progress.  He is going to follow up on a p.r.n. basis with us and he will continue having follow up with ID.  We confirmed today he has a followup appointment.      All questions were answered.   TT 15 minutes, CT 10 minutes.   Again, thank you for allowing me to participate in the care of your patient.    Les Aguilera MD

## 2017-05-16 NOTE — NURSING NOTE
"Reason For Visit:   Chief Complaint   Patient presents with     Surgical Followup     S/P Right Tibia Irrigation and Debridement with Hardware Removal. DOS: 04/12/2017.     RECHECK     Pt. mention he stopped wearing his cam boot, 2 days.        Pain Assessment  Patient Currently in Pain: Yes  0-10 Pain Scale: 5  Primary Pain Location: Tibia  Pain Orientation: Right  Pain Descriptors: Discomfort, Aching, Dull  Alleviating Factors: Pain medication, Rest  Aggravating Factors: Movement, Sitting, Walking               HEIGHT: 5' 9\", WEIGHT: 224 lbs 2.72 oz, BMI: Body mass index is 33.1 kg/(m^2).      Current Outpatient Prescriptions   Medication Sig Dispense Refill     HYDROcodone-acetaminophen (NORCO) 5-325 MG per tablet Take 1-2 tablets by mouth every 8 hours as needed for moderate to severe pain 40 tablet 0     oxyCODONE (ROXICODONE) 5 MG IR tablet Take 1-2 tablets (5-10 mg) by mouth every 3 hours as needed for pain or other (Moderate to Severe) 50 tablet 0     rifampin (RIFADIN) 300 MG capsule Take 1 capsule (300 mg) by mouth daily 30 capsule 1     doxycycline (VIBRA-TABS) 100 MG tablet Take 1 tablet (100 mg) by mouth 2 times daily 30 tablet 1     acetaminophen (TYLENOL) 325 MG tablet Take 2 tablets (650 mg) by mouth every 4 hours as needed for other (mild pain) 100 tablet 0     senna-docusate (SENOKOT-S;PERICOLACE) 8.6-50 MG per tablet Take 1-2 tablets by mouth 2 times daily Take while on oral narcotics to prevent or treat constipation. 30 tablet 0     ondansetron (ZOFRAN-ODT) 4 MG ODT tab Take 1-2 tablets (4-8 mg) by mouth every 8 hours as needed for nausea Dissolve ON the tongue. 4 tablet 0     hydrOXYzine (ATARAX) 25 MG tablet Take 1 tablet (25 mg) by mouth every 6 hours as needed for itching (and nausea) 30 tablet 0     Citalopram Hydrobromide (CELEXA PO)        atenolol (TENORMIN) 50 MG tablet Take 50 mg by mouth every morning        alprazolam (XANAX) 1 MG tablet Take 1 mg by mouth 2 times daily as needed    "          Allergies   Allergen Reactions     Toradol Hives

## 2017-05-22 DIAGNOSIS — Z09 SURGICAL FOLLOW-UP CARE: ICD-10-CM

## 2017-05-22 NOTE — TELEPHONE ENCOUNTER
"Orthopedic pt of Dr. Aguilera s/p Right distal tibia hardware removal, and distal tibia irrigation and debridement done 4/12/17. Malcolm called today requesting refill of pain med. He reports his pain currently at about 5/10 and he's taking 1 hydrocodone approx every 8 hours. Will review with Dr. Aguilera's team.    Malcolm was seen in clinic last week 5/16/17 and plan was determined as follows:  \"PLAN: I discussed with the patient he is making excellent progress. He is going to follow up on a p.r.n. basis with us and he will continue having follow up with ID\".   Because we are no longer following the pt on a regular basis we will not be refilling the hydrocodone again. Pt is directed to ID or his PCP. Called Malcolm back and left message with this response.   "

## 2018-12-01 ENCOUNTER — HOSPITAL ENCOUNTER (EMERGENCY)
Facility: CLINIC | Age: 48
Discharge: HOME | End: 2018-12-01
Attending: EMERGENCY MEDICINE | Admitting: EMERGENCY MEDICINE
Payer: COMMERCIAL

## 2018-12-01 VITALS
HEART RATE: 97 BPM | BODY MASS INDEX: 34.36 KG/M2 | WEIGHT: 240 LBS | DIASTOLIC BLOOD PRESSURE: 90 MMHG | SYSTOLIC BLOOD PRESSURE: 140 MMHG | OXYGEN SATURATION: 95 % | RESPIRATION RATE: 16 BRPM | TEMPERATURE: 99.2 F | HEIGHT: 70 IN

## 2018-12-01 DIAGNOSIS — R03.0 ELEVATED BLOOD PRESSURE READING WITHOUT DIAGNOSIS OF HYPERTENSION: ICD-10-CM

## 2018-12-01 DIAGNOSIS — K04.7 DENTAL INFECTION: ICD-10-CM

## 2018-12-01 DIAGNOSIS — K08.89 PAIN, DENTAL: ICD-10-CM

## 2018-12-01 PROCEDURE — 64400 NJX AA&/STRD TRIGEMINAL NRV: CPT | Mod: 50

## 2018-12-01 PROCEDURE — 99283 EMERGENCY DEPT VISIT LOW MDM: CPT | Mod: 25

## 2018-12-01 RX ORDER — PENICILLIN V POTASSIUM 500 MG/1
500 TABLET, FILM COATED ORAL 4 TIMES DAILY
Qty: 40 TABLET | Refills: 0 | Status: SHIPPED | OUTPATIENT
Start: 2018-12-01 | End: 2018-12-11

## 2018-12-01 RX ORDER — BUPIVACAINE HYDROCHLORIDE 5 MG/ML
INJECTION, SOLUTION PERINEURAL
Status: DISCONTINUED
Start: 2018-12-01 | End: 2018-12-01 | Stop reason: HOSPADM

## 2018-12-01 NOTE — ED AVS SNAPSHOT
[facility] Emergency Department    [address] [place] [address]    Phone:  [phone]    Fax:  [phone]                                       Malcolm Jesus   MRN: [id number]    Department:  [facility] Emergency Department   Date of Visit:  12/1/2018           Patient Information     Date Of Birth          1970        Your diagnoses for this visit were:     Dental infection     Pain, dental     Elevated blood pressure reading without diagnosis of hypertension        You were seen by Ronald Shaw MD and Jerrell Jones APRN CNP.      Follow-up Information     Follow up with as scheduled with your oral surgeon this week In 2 days.        Follow up with Ankit Phelan MD In 2 days.    Specialty:  Family Practice    Contact information:    [facility]  [address] [place] [address]  [phone]          Discharge Instructions       Follow up with Dr Phelan this week to recheck blood pressure this week  Discharge Instructions  Dental Pain    You have been seen today for a toothache. Your pain may be caused by an exposed nerve, an infection (pulpitis), a root abscess, or other problems. You will need to see a dentist for a solution to your tooth problem. Emergency Department care is only to help control your problem until you can see a dentist.  Today, we did not find any sign that your toothache was caused by a serious condition, but sometimes symptoms develop over time and cannot be found during an emergency visit, so it is very important that you follow up with your dentist.      Return to the Emergency Department if:    You develop a fever over 101 degrees Fahrenheit.    You can t open your mouth normally, can t move your tongue well, or can t swallow.    You have new or increased swelling of your face or neck.    You develop drainage of pus or foul smelling material from around your tooth.  What can I do to  help myself?    Take any antibiotic the doctor may have prescribed for you today.    Avoid very hot or very cold foods as both can cause pain.    Make an appointment to see a dentist as soon as possible. If you wish, we can provide you with a list of low-cost dental clinics.   If you were given a prescription for medicine here today, be sure to read all of the information (including the package insert) that comes with your prescription.  This will include important information about the medicine, its side effects, and any warnings that you need to know about.  The pharmacist who fills the prescription can provide more information and answer questions you may have about the medicine.  If you have questions or concerns that the pharmacist cannot address, please call or return to the Emergency Department.   Opioid Medication Information    Pain medications are among the most commonly prescribed medicines, so we are including this information for all our patients. If you did not receive pain medication or get a prescription for pain medicine, you can ignore it.     You may have been given a prescription for an opioid (narcotic) pain medicine and/or have received a pain medicine while here in the Emergency Department. These medicines can make you drowsy or impaired. You must not drive, operate dangerous equipment, or engage in any other dangerous activities while taking these medications. If you drive while taking these medications, you could be arrested for DUI, or driving under the influence. Do not drink any alcohol while you are taking these medications.     Opioid pain medications can cause addiction. If you have a history of chemical dependency of any type, you are at a higher risk of becoming addicted to pain medications.  Only take these prescribed medications to treat your pain when all other options have been tried. Take it for as short a time and as few doses as possible. Store your pain pills in a secure  place, as they are frequently stolen and provide a dangerous opportunity for children or visitors in your house to start abusing these powerful medications. We will not replace any lost or stolen medicine.  As soon as your pain is better, you should flush all your remaining medication.     Many prescription pain medications contain Tylenol  (acetaminophen), including Vicodin , Tylenol #3 , Norco , Lortab , and Percocet .  You should not take any extra pills of Tylenol  if you are using these prescription medications or you can get very sick.  Do not ever take more than 3000 mg of acetaminophen in any 24 hour period.    All opioids tend to cause constipation. Drink plenty of water and eat foods that have a lot of fiber, such as fruits, vegetables, prune juice, apple juice and high fiber cereal.  Take a laxative if you don t move your bowels at least every other day. Miralax , Milk of Magnesia, Colace , or Senna  can be used to keep you regular.      Remember that you can always come back to the Emergency Department if you are not able to see your regular doctor in the amount of time listed above, if you get any new symptoms, or if there is anything that worries you.        Discharge Instructions  Dental Pain    You have been seen today for a toothache. Your pain may be caused by an exposed nerve, an infection (pulpitis), a root abscess, or other problems. You will need to see a dentist for a solution to your tooth problem. Emergency Department care is only to help control your problem until you can see a dentist.  Today, we did not find any sign that your toothache was caused by a serious condition, but sometimes symptoms develop over time and cannot be found during an emergency visit, so it is very important that you follow up with your dentist.      Return to the Emergency Department if:    You develop a fever over 101 degrees Fahrenheit    You can t open your mouth normally, can t move your tongue well, or can t  swallow    You have new or increased swelling of your face or neck.    You develop drainage of pus or foul smelling material from around your tooth.  What can I do to help myself?    Take any antibiotic the doctor may have prescribed for you today.    Avoid very hot or very cold foods as both can cause pain.    Make an appointment to see a dentist as soon as possible. If you wish, we can provide you with a list of low-cost dental clinics.       Remember that you can always come back to the Emergency Department if you are not able to see your regular doctor in the amount of time listed above, if you get any new symptoms, or if there is anything that worries you.        Dental Resources  Name/Address/Phone Eligibility Hours Fee   [facility]  [address], [address]  [place] [address]  [phone] Anyone Call for appointment [place]  Medical Assistance  Private Insurance   [facility]  [address] [place], [place] [id number] [phone] Anyone Call for appointment    Ioana refers to low-cost dental clinics for non-preventive care    [employer] Interpreters available Prices start at   Adults        Cleaning $36-$160        X-Ray $20-40  Children        Cleaning $15        X-ray $10-20        Fluoride $10  Accepts cash, check or credit;  Does not take insurance or MA.   [facility]  [address] [place], [place]  [phone] Anyone Afternoons and evenings    September-May    Answers phones after 10 AM $30.00 per visit   ($15.00 per visit if 62 or older)   Preventive care.  Restoration care; sliding fee; MA   [facility]  [address] [place], [place] [id number] [phone] Children birth to age 18 and pregnant women    [facility] Residents without insurance will be asked to apply for Assured Care. M TH F 8:30 am - 5 pm  T W 8:30 am - 7 pm    30 locations metro wide    Call for appointment and to  confirm hours. Sliding Fee  [facility]  Medical Assistance  Assured Access  Private Insurance    8 Languages Spoken   [facility]  [address] [place] [address]  [phone] Anyone Call for appointment Sliding Fee  Accept insurance, [place],   [place] and self-pay.  Call if no insurance.    All services provided.  Staff fluent in Hmong, Laotian, Breezy, Serbian, Czech, Eritrean, and Farsi.   [facility]  [address] [place] [address]  [phone] Children  Adults in a walk in basis Mon - Fri. 8 - 5 pm       (closed 1-2 pm)  General Dentists & Hygienists  Private Dentists  Dentures Fees based on family size and income ranging from 40% - 100% payment by patient.   [facility]  [address] [place]  [address]    [phone] Anyone Mon - Fri 7:30 am - 5:00 pm  By Appt.    Tues & Wed @ 3:00 call for urgent care Appt for next day service Sliding fee:  MA; Insurance   [facility]  [address] [place]  [address]  [phone] Anyone Call for an appointment.  Days open vary every semester. Adult cleaning $25  Child cleaning $15  X-Rays $10-$15  Whitening services available  $75, includes cleaning  Seniors 50% off   [facility]  [address] [place]  [address]  [phone] Anyone M-F 8 am - 5 pm Most insurances accepted.  MA and Sliding Scale.   [facility]  [address] [place] [address]  [phone] Anyone without insurance Call to make appt   M-F 9:00 am - 5 pm   (Closed noon hour 12-1)    6 pm- 8 pm Evening hours also available for care Sliding fee based on income and size of household.   [facility]  Dental Clinic  [address] [place]  [address]  [phone] press option 1    For the [facility] press option 4 Anyone              Anyone Monday  4 - 6:30  pm  Tuesday 12:30 - 3 & 4-6:30  Thursday 8:30 - 11 am & 12-2:30 pm  September through May only    All year around on Thursdays from 5-9 pm (only time a dentist is in.) Cleanings & X-Rays Only  Cleanings:  Adults $30                   Kids $20  X-Rays:  Adults $34                Kids $10    MA and Sliding fee   [facility]  [address] [place], [place] [phone] Anyone    (Intercastingong interpreters available) M-F 8:00 am - 5:00 pm       By appointment only  (same day appointments available) Sliding fee ($40+ may be due at appointment, remainder billed); MA; Insurance   [facility]  [address] [place], [place] [phone]   Anyone    ([employer] interpreters available) M-Th 8:00 am - 8:00 pm  F 8:00 am - 5:00 pm    By appointment only  (same day appointments available) Sliding fee ($40+ may be due at appointment, remainder billed); MA; Insurance   [facility]  [address] [place], [place]  [phone] Anyone    Must live within [facility] to qualify for sliding fee services Mon, Tues, Thurs, Fri  8:30 am - 5:00 pm  Wed. 8:30 am - 7:00 pm  All other services by appt. only Sliding Fee; MA   Offer payment plans    Have financial workers that will assist with [place] and will use sliding fee for those who do not qualify.      [facility]  [address] [place] [phone] Anyone without insurance     Hours and day of week vary  (Call ahead for hours)    Walk-in only Free Services    Cleanings; Fillings; Extractions   The [address]  [address] [phone] Anyone Call for an appointment Accept patients with [place] and Medical Assistance.  10% discount if bill is paid in full on day of service.  No sliding fee scale.     [facility]  [address] [place], [place] [phone] Anyone M-F  8 am-5  pm  Call for appt.    Walk-in hours 8 am - 11am and 1 pm - 5 pm, however take scheduled appointments first    No emergency services or oral surgeries Sliding Fee available with an [place] or [place] denial letter and proof of income.    Accepts Assured Access card and MA coverage.     Name/Address/Phone Eligibility Hours Fee   [facility]  [address] [place]  [address]  [phone] Anyone with emergencies only M & W clinic begins at 6 pm   Call ahead    Alternate Fridays for children by Appt only Free   [facility]  [address] [place] [address]  [phone]    Emergencies (adults only):  [phone] Anyone Free walk-in screens for oral surgery    Call ahead for hours    All other services by appt. only  Accepts MA for pediatrics only    Rates are about 25% - 40% less than private dental office.    No sliding fee [facility]  [address] [place] [address]  [phone] Anyone as long as they do not have health insurance Hours on Mondays, Tuesdays, and Thursdays Sliding fees based on monthly income    No root canals, tooth pulls or emergencies   [facility]  [address] [place] [phone] Anyone  M - F 8:00 am - 5:00 pm  Wed 8:00 am - 8 pm Sliding fees; MA; Insurance   [facility]  [address] [place] [address]  [phone] Anyone age 55+ M - F 8:00 am - 4:30 pm    Appt. only Set slightly lower fees;  MA; Insurance         Give Kids a smile day in [facility] Children Takes place in February at a few locations                          Dental Pain:      Dear Emergency Department Patient:     Here at [facility], we are always pleased to evaluate you for emergency conditions and offer a screening examination. Today, we have seen you and determined that you have dental pain and/or a dental problem.  We do not have the equipment and/or  advanced training to perform definitive dental care, therefore, you need to be seen by a dentist for further care.     You may see your regular dentist if you have one, or we have attached a list of community dental providers, including some who provide care at a reduced fee.      Please be aware that if a narcotic medication was prescribed, it will not be refilled in the emergency department.  Accordingly, if you should have ongoing problems and/or pain, you should contact a dentist right away for definitive treatment.    Sincerely,        [facility])        Discharge Instructions  Hypertension - High Blood Pressure    During you visit to the Emergency Department, your blood pressure was higher than the recommended blood pressure.  This may be related to stress, pain, medication or other temporary conditions. In these cases, your blood pressure may return to normal on its own. If you have a history of high blood pressure, you may need to have your doctor adjust your medications. Sometimes, your high measurement here may indicate that you have developed high blood pressure that will stay high unless it is treated. Sudden very high blood pressure can cause problems, but usually high blood pressure causes problems over months to years.      Blood pressure is almost never lowered in the Emergency Department, because studies have shown that lowering blood pressure too quickly is much more dangerous than leaving it alone.    You need to follow up with your doctor in 1-3 days to get your blood pressure rechecked.     Return to the Emergency Department if you start to have:    A severe headache.    Chest pain.    Shortness of breath.    Weakness or numbness that affects one part of the body.    Confusion.    Vision changes.    Significant swelling of legs and/or eyes.    A reaction to any medication started in the Emergency Department.    What can I do to help  myself?    Avoid alcohol.    Take any blood pressure medicine that you are prescribed.    Get a good night s sleep.    Lower your salt intake.    Exercise.    Lose weight.    Manage stress.    If blood pressure medication was started in the Emergency Department:    The medicine may not have an immediate effect. The body and brain determine what blood pressure you have. The medicine s job is to retrain the body s  thermostat  to a lower blood pressure.    You will need to follow up with your doctor to see how this medicine is working for you.  If you were given a prescription for medicine here today, be sure to read all of the information (including the package insert) that comes with your prescription.  This will include important information about the medicine, its side effects, and any warnings that you need to know about.  The pharmacist who fills the prescription can provide more information and answer questions you may have about the medicine.  If you have questions or concerns that the pharmacist cannot address, please call or return to the Emergency Department.   Opioid Medication Information    Pain medications are among the most commonly prescribed medicines, so we are including this information for all our patients. If you did not receive pain medication or get a prescription for pain medicine, you can ignore it.     You may have been given a prescription for an opioid (narcotic) pain medicine and/or have received a pain medicine while here in the Emergency Department. These medicines can make you drowsy or impaired. You must not drive, operate dangerous equipment, or engage in any other dangerous activities while taking these medications. If you drive while taking these medications, you could be arrested for DUI, or driving under the influence. Do not drink any alcohol while you are taking these medications.     Opioid pain medications can cause addiction. If you have a history of chemical dependency of  any type, you are at a higher risk of becoming addicted to pain medications.  Only take these prescribed medications to treat your pain when all other options have been tried. Take it for as short a time and as few doses as possible. Store your pain pills in a secure place, as they are frequently stolen and provide a dangerous opportunity for children or visitors in your house to start abusing these powerful medications. We will not replace any lost or stolen medicine.  As soon as your pain is better, you should flush all your remaining medication.     Many prescription pain medications contain Tylenol  (acetaminophen), including Vicodin , Tylenol #3 , Norco , Lortab , and Percocet .  You should not take any extra pills of Tylenol  if you are using these prescription medications or you can get very sick.  Do not ever take more than 3000 mg of acetaminophen in any 24 hour period.    All opioids tend to cause constipation. Drink plenty of water and eat foods that have a lot of fiber, such as fruits, vegetables, prune juice, apple juice and high fiber cereal.  Take a laxative if you don t move your bowels at least every other day. Miralax , Milk of Magnesia, Colace , or Senna  can be used to keep you regular.      Remember that you can always come back to the Emergency Department if you are not able to see your regular doctor in the amount of time listed above, if you get any new symptoms, or if there is anything that worries you.        24 Hour Appointment Hotline       To make an appointment at any [facility], call [phone]). If you don't have a family doctor or clinic, we will help you find one. [facility] clinics are conveniently located to serve the needs of you and your family.             Review of your medicines      START taking        Dose / Directions Last dose taken    penicillin V 500 MG tablet   Commonly known as:  VEETID   Dose:  500 mg   Quantity:  40 tablet        Take 1 tablet  (500 mg) by mouth 4 times daily for 10 days   Refills:  0          Our records show that you are taking the medicines listed below. If these are incorrect, please call your family doctor or clinic.        Dose / Directions Last dose taken    acetaminophen 325 MG tablet   Commonly known as:  TYLENOL   Dose:  650 mg   Quantity:  100 tablet        Take 2 tablets (650 mg) by mouth every 4 hours as needed for other (mild pain)   Refills:  0        hydrOXYzine 25 MG tablet   Commonly known as:  ATARAX   Dose:  25 mg   Quantity:  30 tablet        Take 1 tablet (25 mg) by mouth every 6 hours as needed for itching (and nausea)   Refills:  0        senna-docusate 8.6-50 MG tablet   Commonly known as:  SENOKOT-S/PERICOLACE   Dose:  1-2 tablet   Quantity:  30 tablet        Take 1-2 tablets by mouth 2 times daily Take while on oral narcotics to prevent or treat constipation.   Refills:  0        XANAX 1 MG tablet   Dose:  1 mg   Generic drug:  ALPRAZolam        Take 1 mg by mouth 2 times daily as needed   Refills:  0                Prescriptions were sent or printed at these locations (1 Prescription)                   Other Prescriptions                Printed at Department/Unit printer (1 of 1)         penicillin V (VEETID) 500 MG tablet                Orders Needing Specimen Collection     None      Pending Results     No orders found from 11/29/2018 to 12/2/2018.            Pending Culture Results     No orders found from 11/29/2018 to 12/2/2018.            Pending Results Instructions     If you had any lab results that were not finalized at the time of your Discharge, you can call the ED Lab Result RN at [phone]. You will be contacted by this team for any positive Lab results or changes in treatment. The nurses are available 7 days a week from 10A to 6:30P.  You can leave a message 24 hours per day and they will return your call.        Test Results From Your Hospital Stay               Clinical Quality Measure: Blood  Pressure Screening     Your blood pressure was checked while you were in the emergency department today. The last reading we obtained was  BP: 140/90 . Please read the guidelines below about what these numbers mean and what you should do about them.  If your systolic blood pressure (the top number) is less than 120 and your diastolic blood pressure (the bottom number) is less than 80, then your blood pressure is normal. There is nothing more that you need to do about it.  If your systolic blood pressure (the top number) is 120-139 or your diastolic blood pressure (the bottom number) is 80-89, your blood pressure may be higher than it should be. You should have your blood pressure rechecked within a year by a primary care provider.  If your systolic blood pressure (the top number) is 140 or greater or your diastolic blood pressure (the bottom number) is 90 or greater, you may have high blood pressure. High blood pressure is treatable, but if left untreated over time it can put you at risk for heart attack, stroke, or kidney failure. You should have your blood pressure rechecked by a primary care provider within the next 4 weeks.  If your provider in the emergency department today gave you specific instructions to follow-up with your doctor or provider even sooner than that, you should follow that instruction and not wait for up to 4 weeks for your follow-up visit.        Thank you for choosing [facility]       Thank you for choosing [facility] for your care. Our goal is always to provide you with excellent care. Hearing back from our patients is one way we can continue to improve our services. Please take a few minutes to complete the written survey that you may receive in the mail after you visit with us. Thank you!        [employer] Information     [employer] gives you secure access to your electronic health record. If you see a primary care provider, you can also send messages to your care team and make appointments. If you  have questions, please call your primary care clinic.  If you do not have a primary care provider, please call [phone] and they will assist you.        Care EveryWhere ID     This is your Care EveryWhere ID. This could be used by other organizations to access your [facility] medical records  [id number]        Equal Access to Services     CHICO CARR : Gary Cummings, aranza lujan, mayra dominguez. So [facility] [phone].    ATENCIÓN: Si habla español, tiene a urbina disposición servicios gratuitos de asistencia lingüística. Llame al [phone].    We comply with applicable federal civil rights laws and [place] laws. We do not discriminate on the basis of race, color, national origin, age, disability, sex, sexual orientation, or gender identity.            After Visit Summary       This is your record. Keep this with you and show to your community pharmacist(s) and doctor(s) at your next visit.

## 2018-12-01 NOTE — ED AVS SNAPSHOT
[facility] Emergency Department    [address]    [facility] [address]    Phone:  [phone]    Fax:  [phone]                                       Malcolm Jesus   MRN: [id number]    Department:  [facility] Emergency Department   Date of Visit:  12/1/2018           After Visit Summary Signature Page     I have received my discharge instructions, and my questions have been answered. I have discussed any challenges I see with this plan with the nurse or doctor.    ..........................................................................................................................................  Patient/Patient Representative Signature      ..........................................................................................................................................  Patient Representative Print Name and Relationship to Patient    ..................................................               ................................................  Date                                   Time    ..........................................................................................................................................  Reviewed by Signature/Title    ...................................................              ..............................................  Date                                               Time          22EPIC Rev 08/18

## 2018-12-02 NOTE — DISCHARGE INSTRUCTIONS
Follow up with Dr Phelan this week to recheck blood pressure this week  Discharge Instructions  Dental Pain    You have been seen today for a toothache. Your pain may be caused by an exposed nerve, an infection (pulpitis), a root abscess, or other problems. You will need to see a dentist for a solution to your tooth problem. Emergency Department care is only to help control your problem until you can see a dentist.  Today, we did not find any sign that your toothache was caused by a serious condition, but sometimes symptoms develop over time and cannot be found during an emergency visit, so it is very important that you follow up with your dentist.      Return to the Emergency Department if:    You develop a fever over 101 degrees Fahrenheit.    You can t open your mouth normally, can t move your tongue well, or can t swallow.    You have new or increased swelling of your face or neck.    You develop drainage of pus or foul smelling material from around your tooth.  What can I do to help myself?    Take any antibiotic the doctor may have prescribed for you today.    Avoid very hot or very cold foods as both can cause pain.    Make an appointment to see a dentist as soon as possible. If you wish, we can provide you with a list of low-cost dental clinics.   If you were given a prescription for medicine here today, be sure to read all of the information (including the package insert) that comes with your prescription.  This will include important information about the medicine, its side effects, and any warnings that you need to know about.  The pharmacist who fills the prescription can provide more information and answer questions you may have about the medicine.  If you have questions or concerns that the pharmacist cannot address, please call or return to the Emergency Department.   Opioid Medication Information    Pain medications are among the most commonly prescribed medicines, so we are including this  information for all our patients. If you did not receive pain medication or get a prescription for pain medicine, you can ignore it.     You may have been given a prescription for an opioid (narcotic) pain medicine and/or have received a pain medicine while here in the Emergency Department. These medicines can make you drowsy or impaired. You must not drive, operate dangerous equipment, or engage in any other dangerous activities while taking these medications. If you drive while taking these medications, you could be arrested for DUI, or driving under the influence. Do not drink any alcohol while you are taking these medications.     Opioid pain medications can cause addiction. If you have a history of chemical dependency of any type, you are at a higher risk of becoming addicted to pain medications.  Only take these prescribed medications to treat your pain when all other options have been tried. Take it for as short a time and as few doses as possible. Store your pain pills in a secure place, as they are frequently stolen and provide a dangerous opportunity for children or visitors in your house to start abusing these powerful medications. We will not replace any lost or stolen medicine.  As soon as your pain is better, you should flush all your remaining medication.     Many prescription pain medications contain Tylenol  (acetaminophen), including Vicodin , Tylenol #3 , Norco , Lortab , and Percocet .  You should not take any extra pills of Tylenol  if you are using these prescription medications or you can get very sick.  Do not ever take more than 3000 mg of acetaminophen in any 24 hour period.    All opioids tend to cause constipation. Drink plenty of water and eat foods that have a lot of fiber, such as fruits, vegetables, prune juice, apple juice and high fiber cereal.  Take a laxative if you don t move your bowels at least every other day. Miralax , Milk of Magnesia, Colace , or Senna  can be used to keep  you regular.      Remember that you can always come back to the Emergency Department if you are not able to see your regular doctor in the amount of time listed above, if you get any new symptoms, or if there is anything that worries you.        Discharge Instructions  Dental Pain    You have been seen today for a toothache. Your pain may be caused by an exposed nerve, an infection (pulpitis), a root abscess, or other problems. You will need to see a dentist for a solution to your tooth problem. Emergency Department care is only to help control your problem until you can see a dentist.  Today, we did not find any sign that your toothache was caused by a serious condition, but sometimes symptoms develop over time and cannot be found during an emergency visit, so it is very important that you follow up with your dentist.      Return to the Emergency Department if:    You develop a fever over 101 degrees Fahrenheit    You can t open your mouth normally, can t move your tongue well, or can t swallow    You have new or increased swelling of your face or neck.    You develop drainage of pus or foul smelling material from around your tooth.  What can I do to help myself?    Take any antibiotic the doctor may have prescribed for you today.    Avoid very hot or very cold foods as both can cause pain.    Make an appointment to see a dentist as soon as possible. If you wish, we can provide you with a list of low-cost dental clinics.       Remember that you can always come back to the Emergency Department if you are not able to see your regular doctor in the amount of time listed above, if you get any new symptoms, or if there is anything that worries you.        Dental Resources  Name/Address/Phone Eligibility Hours Fee   [facility]  [address], [address]  [place], [place] [address] [phone] Anyone Call for appointment [place]  Medical Assistance  Private Insurance   [facility]  [address] [place] [address]  [phone] Anyone Call for appointment    [facility] refers to low-cost dental clinics for non-preventive care    [place] Interpreters available Prices start at   Adults        Cleaning $36-$160        X-Ray $20-40  Children        Cleaning $15        X-ray $10-20        Fluoride $10  Accepts cash, check or credit;  Does not take insurance or MA.   [facility]  [address] [place]  [address]  [phone] Anyone Afternoons and evenings    September-May    Answers phones after 10 AM $30.00 per visit   ($15.00 per visit if 62 or older)   Preventive care.  Restoration care; sliding fee; [place]   [facility]  [address] [place] [address]  [phone] Children birth to age 18 and pregnant women    [facility] Residents without insurance will be asked to apply for Assured Care. M TH F 8:30 am - 5 pm  T W 8:30 am - 7 pm    30 locations metro wide    Call for appointment and to confirm hours. Sliding Fee  [facility]  Medical Assistance  Assured Access  Private Insurance    8 Languages Spoken   [facility]  [address] [place] [address]  [phone] Anyone Call for appointment Sliding Fee  Accept insurance, [place],   [place] and self-pay.  Call if no insurance.    All services provided.  Staff fluent in Hmong, Laotian, Breezy, Faroese, Setswana, [place], and Farsi.   [facility]  [address] [place] [address]  [phone] Children  Adults in a walk in basis Mon - Fri. 8 - 5 pm       (closed 1-2 pm)  General Dentists & Hygienists  Private Dentists  Dentures Fees based on family size and income ranging from 40% - 100% payment by patient.   [facility]  [address], [place]  [id number] [phone] Anyone Mon - Fri 7:30 am - 5:00 pm  By Appt.    Tues & Wed @ 3:00 call for urgent care Appt for next day service  Sliding fee:  [place]; Insurance   [facility]  [address] [place]  [address]  [phone] Anyone Call for an appointment.  Days open vary every semester. Adult cleaning $25  Child cleaning $15  X-Rays $10-$15  Whitening services available  $75, includes cleaning  Seniors 50% off   [facility]  [address] [place]  [address]  [phone] Anyone M-F 8 am - 5 pm Most insurances accepted.  MA and Sliding Scale.   [facility]  [address] [place] [address]  [id number]    [phone] Anyone without insurance Call to make appt   M-F 9:00 am - 5 pm   (Closed noon hour 12-1)    6 pm- 8 pm Evening hours also available for care Sliding fee based on income and size of household.   [facility]  [address] [place]  [address]  [phone] press option 1    For the [facility] press option 4 Anyone              Anyone Monday  4 - 6:30 pm  Tuesday 12:30 - 3 & 4-6:30  Thursday 8:30 - 11 am & 12-2:30 pm  September through May only    All year around on Thursdays from 5-9 pm (only time a dentist is in.) Cleanings & X-Rays Only  Cleanings:  Adults $30                   Kids $20  X-Rays:  Adults $34                Kids $10    MA and Sliding fee   [address] [address]    [phone] Anyone    ([employer] interpreters available) M-F 8:00 am - 5:00 pm       By appointment only  (same day appointments available) Sliding fee ($40+ may be due at appointment, remainder billed); [place]; Insurance   [facility]  [address] [place], [place] [phone]   Anyone    (Egr [employer] interpreters available) M-Th 8:00 am - 8:00 pm  F 8:00 am - 5:00 pm    By appointment only  (same day appointments available) Sliding fee ($40+ may be due at appointment, remainder billed); MA; Insurance   [facility]  [address] [place]  [address]  [phone] Anyone    Must live within [facility] to qualify for sliding fee services Mon, Tues, Thurs, Fri  8:30 am - 5:00 pm  Wed. 8:30 am - 7:00 pm  All other services by appt. only Sliding Fee; MA   Offer payment plans    Have financial workers that will assist with [place] and will use sliding fee for those who do not qualify.      [facility]  [address] [place] [address]  [phone] Anyone without insurance     Hours and day of week vary  (Call ahead for hours)    Walk-in only Free Services    Cleanings; Fillings; Extractions   [facility]  [address] [place] [address]  [id number] [phone] Anyone Call for an appointment Accept patients with [place] and Medical Assistance.  10% discount if bill is paid in full on day of service.  No sliding fee scale.     [facility]  [address] [place], [place] [id number] [phone] Anyone M-F  8 am-5 pm  Call for appt.    Walk-in hours 8 am - 11am and 1 pm - 5 pm, however take scheduled appointments first    No emergency services or oral surgeries Sliding Fee available with an [place] or [place] denial letter and proof of income.    Accepts Assured Access card and [place] coverage.     Name/Address/Phone Eligibility Hours Fee   [facility]  [address] [place], [place]  [phone] Anyone with emergencies only M & W clinic begins at 6 pm   Call ahead    Alternate Fridays for children by Appt only Free   [facility]  [address] [place], [place] [address] [phone]    Emergencies (adults only):  [phone] Anyone Free walk-in screens for oral surgery    Call ahead for hours    All other services by appt. only  Accepts MA for pediatrics only    Rates are about 25% - 40% less than private dental office.    No sliding fee scale   [facility] [address] [place]  [address]  [phone] Anyone as long as they do not have health insurance Hours on Mondays, Tuesdays, and Thursdays Sliding fees based on monthly income    No root canals, tooth pulls or emergencies   [facility]  [address] [id number] [phone] Anyone  M - F 8:00 am - 5:00 pm  Wed 8:00 am - 8 pm Sliding fees; [place]; Insurance   [facility] [address]  [phone] Anyone age 55+ M - F 8:00 am - 4:30 pm    Appt. only Set slightly lower fees;  MA; Insurance         Give Kids a smile day in [facility] Children Takes place in February at a few locations                          Dental Pain:      Dear Emergency Department Patient:     Here at [facility], we are always pleased to evaluate you for emergency conditions and offer a screening examination. Today, we have seen you and determined that you have dental pain and/or a dental problem.  We do not have the equipment and/or advanced training to perform definitive dental care, therefore, you need to be seen by a dentist for further care.     You may see your regular dentist if you have one, or we have attached a list of community dental providers, including some who provide care at a reduced fee.      Please be aware that if a narcotic medication was prescribed, it will not be refilled in the emergency department.  Accordingly, if you should have ongoing problems and/or pain, you should contact a dentist right away for definitive treatment.    Sincerely,        [facility]        Discharge Instructions  Hypertension - High Blood Pressure    During you visit to the Emergency Department, your blood pressure was higher than the recommended blood pressure.  This may be related to stress, pain, medication or other temporary conditions. In these cases, your blood pressure may return to normal on its own. If you have a  history of high blood pressure, you may need to have your doctor adjust your medications. Sometimes, your high measurement here may indicate that you have developed high blood pressure that will stay high unless it is treated. Sudden very high blood pressure can cause problems, but usually high blood pressure causes problems over months to years.      Blood pressure is almost never lowered in the Emergency Department, because studies have shown that lowering blood pressure too quickly is much more dangerous than leaving it alone.    You need to follow up with your doctor in 1-3 days to get your blood pressure rechecked.     Return to the Emergency Department if you start to have:    A severe headache.    Chest pain.    Shortness of breath.    Weakness or numbness that affects one part of the body.    Confusion.    Vision changes.    Significant swelling of legs and/or eyes.    A reaction to any medication started in the Emergency Department.    What can I do to help myself?    Avoid alcohol.    Take any blood pressure medicine that you are prescribed.    Get a good night s sleep.    Lower your salt intake.    Exercise.    Lose weight.    Manage stress.    If blood pressure medication was started in the Emergency Department:    The medicine may not have an immediate effect. The body and brain determine what blood pressure you have. The medicine s job is to retrain the body s  thermostat  to a lower blood pressure.    You will need to follow up with your doctor to see how this medicine is working for you.  If you were given a prescription for medicine here today, be sure to read all of the information (including the package insert) that comes with your prescription.  This will include important information about the medicine, its side effects, and any warnings that you need to know about.  The pharmacist who fills the prescription can provide more information and answer questions you may have about the medicine.  If  you have questions or concerns that the pharmacist cannot address, please call or return to the Emergency Department.   Opioid Medication Information    Pain medications are among the most commonly prescribed medicines, so we are including this information for all our patients. If you did not receive pain medication or get a prescription for pain medicine, you can ignore it.     You may have been given a prescription for an opioid (narcotic) pain medicine and/or have received a pain medicine while here in the Emergency Department. These medicines can make you drowsy or impaired. You must not drive, operate dangerous equipment, or engage in any other dangerous activities while taking these medications. If you drive while taking these medications, you could be arrested for DUI, or driving under the influence. Do not drink any alcohol while you are taking these medications.     Opioid pain medications can cause addiction. If you have a history of chemical dependency of any type, you are at a higher risk of becoming addicted to pain medications.  Only take these prescribed medications to treat your pain when all other options have been tried. Take it for as short a time and as few doses as possible. Store your pain pills in a secure place, as they are frequently stolen and provide a dangerous opportunity for children or visitors in your house to start abusing these powerful medications. We will not replace any lost or stolen medicine.  As soon as your pain is better, you should flush all your remaining medication.     Many prescription pain medications contain Tylenol  (acetaminophen), including Vicodin , Tylenol #3 , Norco , Lortab , and Percocet .  You should not take any extra pills of Tylenol  if you are using these prescription medications or you can get very sick.  Do not ever take more than 3000 mg of acetaminophen in any 24 hour period.    All opioids tend to cause constipation. Drink plenty of water and eat  foods that have a lot of fiber, such as fruits, vegetables, prune juice, apple juice and high fiber cereal.  Take a laxative if you don t move your bowels at least every other day. Miralax , Milk of Magnesia, Colace , or Senna  can be used to keep you regular.      Remember that you can always come back to the Emergency Department if you are not able to see your regular doctor in the amount of time listed above, if you get any new symptoms, or if there is anything that worries you.

## 2018-12-02 NOTE — ED PROVIDER NOTES
"  History     Chief Complaint:  Dental Pain    HPI   Malcolm Jesus is a 48 year old male who presents to the Emergency Department today for evaluation of dental pain. The patient reports he has not been taking good care of his teeth for years. He has had pain for years and has an appointment to have his teeth pulled on the 16th of this month. He was trying to wait out his pain. One week ago his pain increased bilaterally in his upper teeth. He is here tonight because he is unable to wait until the 16th for his pain relief.     Allergies:  Toradol     Medications:    Xanax  Atarax  Senokot    Past Medical History:    Acute pain  GERD  Leg fracture  Sleep apnea  Diabetes, but resolved with weight loss    Past Surgical History:    Graft free vascularized  Irrigation and debridement lower extremity  ORIF tibia  ORIF fibula  Orthopedic surgery  Soft tissue surgery    Family History:    Mother: substance abuse    Social History:  Smoking Status: Current Some Day Smoker   Types: Cigarettes  Alcohol Use: Negative  Marital Status:        Review of Systems   HENT: Positive for dental problem.    All other systems reviewed and are negative.    Physical Exam     Patient Vitals for the past 24 hrs:   BP Temp Temp src Pulse Resp SpO2 Height Weight   12/01/18 2110 140/90 99.2  F (37.3  C) Temporal - - - - -   12/01/18 2051 - - - - - 95 % - -   12/01/18 2047 (!) 193/116 99.6  F (37.6  C) Temporal 97 16 - 1.778 m (5' 10\") 108.9 kg (240 lb)     Physical Exam  General: Laying on bed  HEENT:   The scalp and head appear normal    The pupils are equal, round, and reactive to light    Extraocular muscles are intact.    The nose is normal.    The oropharynx is normal.      Uvula is in the midline.     Terrible upper dentition peridontal disease    No facial swelling or erythema    No cervical adenopathy    No trismus  Neck:  Normal range of motion.    Lungs:  Clear.      No rales, no wheezing.      There is no tachypnea.  " Non-labored.  Cardiac: Regular rate.      Normal S1 and S2.      No pathological murmur/rub    Abdomen: Soft. No distension, no localized tenderness or rebound.  MS:  Normal tone. Normal movement of all extremities.   Neurologic:     Normal mentation.  No cranial nerve deficits.  No focal motor or sensory                            changes.      Speech normal.  Psych:  Awake.     Alert.      Normal affect.      Appropriate interactions.  Skin:  No rash.      No lesions.      Emergency Department Course     Procedures:  PROCEDURE:  Dental Block    LOCATION:  Bilateral maxillary    ANESTHESIA: Regional block using bupivacaine 0.5% total of 2.5 mLs left and right    PROCEDURE NOTE: The patient tolerated the procedure well with good relief of discomfort and there were no complications.    Emergency Department Course:  Past medical records, nursing notes, and vitals reviewed.  2055: I performed an exam of the patient and obtained history, as documented above.    2100: I performed a dental block as documented above.    2112: I rechecked the patient. Explained findings to the patient.    Findings and plan explained to the Patient. Patient discharged home with instructions regarding supportive care, medications, and reasons to return. The importance of close follow-up was reviewed.     Impression & Plan      Medical Decision Making:  Malcolm Jesus is a 48 year old male who presents for dental pain. He has an appointment with the maxillofacial surgeon this week but he said the last two days it has been very painful. Although his temperature was at 99.6 when he came in he was very overdressed. It was rechecked here and it was found to be 99.2. He denied any systemic symptoms and just reports pain in the maxillary region both left and right.    There is no sign of a deep space infection. The dental block provided complete relief of his pain. I rechecked his blood pressure as it was quite elevated when he came in and it was down  to 140/90. I have recommended that he have that rechecked again this week at clinic. He will do that when he follows up as well.     Diagnosis:    ICD-10-CM   1. Dental infection K04.7   2. Pain, dental K08.89   3. Elevated blood pressure reading without diagnosis of hypertension R03.0     Disposition:  discharged to home    Discharge Medications:  New Prescriptions    PENICILLIN V (VEETID) 500 MG TABLET    Take 1 tablet (500 mg) by mouth 4 times daily for 10 days     Nicky Henley  12/1/2018   [facility] EMERGENCY DEPARTMENT  Scribe Disclosure:  I, Nicky Kale, am serving as a scribe at 8:55 PM on 12/1/2018 to document services personally performed by Ronald Shaw MD based on my observations and the provider's statements to me.        Ronald Shaw MD  12/02/18 1019

## 2019-02-12 ENCOUNTER — MEDICAL CORRESPONDENCE (OUTPATIENT)
Dept: HEALTH INFORMATION MANAGEMENT | Facility: CLINIC | Age: 49
End: 2019-02-12

## 2019-03-26 ENCOUNTER — MEDICAL CORRESPONDENCE (OUTPATIENT)
Dept: HEALTH INFORMATION MANAGEMENT | Facility: CLINIC | Age: 49
End: 2019-03-26

## 2020-03-02 ENCOUNTER — HEALTH MAINTENANCE LETTER (OUTPATIENT)
Age: 50
End: 2020-03-02

## 2020-12-14 ENCOUNTER — HEALTH MAINTENANCE LETTER (OUTPATIENT)
Age: 50
End: 2020-12-14

## 2021-04-18 ENCOUNTER — HEALTH MAINTENANCE LETTER (OUTPATIENT)
Age: 51
End: 2021-04-18

## 2021-10-02 ENCOUNTER — HEALTH MAINTENANCE LETTER (OUTPATIENT)
Age: 51
End: 2021-10-02

## 2022-05-14 ENCOUNTER — HEALTH MAINTENANCE LETTER (OUTPATIENT)
Age: 52
End: 2022-05-14

## 2022-09-03 ENCOUNTER — HEALTH MAINTENANCE LETTER (OUTPATIENT)
Age: 52
End: 2022-09-03

## 2023-06-03 ENCOUNTER — HEALTH MAINTENANCE LETTER (OUTPATIENT)
Age: 53
End: 2023-06-03

## (undated) DEVICE — SOL NACL 0.9% IRRIG 1000ML BOTTLE 2F7124

## (undated) DEVICE — GLOVE PROTEXIS POWDER FREE 8.0 ORTHOPEDIC 2D73ET80

## (undated) DEVICE — LINEN GOWN XLG 5407

## (undated) DEVICE — BLADE KNIFE SURG 10 371110

## (undated) DEVICE — SOL WATER IRRIG 1000ML BOTTLE 2F7114

## (undated) DEVICE — DRSG ABDOMINAL PAD UNSTERILE 8X10" WND152764B

## (undated) DEVICE — DRSG ADAPTIC 3X8" 6113

## (undated) DEVICE — DECANTER TRANSFER DEVICE 2008S

## (undated) DEVICE — ESU GROUND PAD ADULT W/CORD E7507

## (undated) DEVICE — LINEN ORTHO PACK 5446

## (undated) DEVICE — GLOVE PROTEXIS POWDER FREE SMT 7.5  2D72PT75X

## (undated) DEVICE — DRSG GAUZE 4X4" 3033

## (undated) DEVICE — Device

## (undated) DEVICE — SUCTION MANIFOLD NEPTUNE 2 SYS 4 PORT 0702-020-000

## (undated) DEVICE — PACK LOWER EXTREMITY CUSTOM ASC

## (undated) DEVICE — SU ETHILON 3-0 PS-1 18" 1663G

## (undated) DEVICE — GOWN IMPERVIOUS SPECIALTY XLG/XLONG 32474

## (undated) RX ORDER — PROPOFOL 10 MG/ML
INJECTION, EMULSION INTRAVENOUS
Status: DISPENSED
Start: 2017-04-12

## (undated) RX ORDER — FENTANYL CITRATE 50 UG/ML
INJECTION, SOLUTION INTRAMUSCULAR; INTRAVENOUS
Status: DISPENSED
Start: 2017-04-12

## (undated) RX ORDER — OXYCODONE HYDROCHLORIDE 5 MG/1
TABLET ORAL
Status: DISPENSED
Start: 2017-04-12

## (undated) RX ORDER — IBUPROFEN 200 MG
TABLET ORAL
Status: DISPENSED
Start: 2017-04-12

## (undated) RX ORDER — DEXAMETHASONE SODIUM PHOSPHATE 4 MG/ML
INJECTION, SOLUTION INTRA-ARTICULAR; INTRALESIONAL; INTRAMUSCULAR; INTRAVENOUS; SOFT TISSUE
Status: DISPENSED
Start: 2017-04-12

## (undated) RX ORDER — GABAPENTIN 300 MG/1
CAPSULE ORAL
Status: DISPENSED
Start: 2017-04-12

## (undated) RX ORDER — LIDOCAINE HYDROCHLORIDE 10 MG/ML
INJECTION, SOLUTION EPIDURAL; INFILTRATION; INTRACAUDAL; PERINEURAL
Status: DISPENSED
Start: 2017-04-12

## (undated) RX ORDER — ACETAMINOPHEN 10 MG/ML
INJECTION, SOLUTION INTRAVENOUS
Status: DISPENSED
Start: 2017-04-12

## (undated) RX ORDER — ACETAMINOPHEN 325 MG/1
TABLET ORAL
Status: DISPENSED
Start: 2017-04-12

## (undated) RX ORDER — ONDANSETRON 2 MG/ML
INJECTION INTRAMUSCULAR; INTRAVENOUS
Status: DISPENSED
Start: 2017-04-12

## (undated) RX ORDER — PHENYLEPHRINE HCL IN 0.9% NACL 1 MG/10 ML
SYRINGE (ML) INTRAVENOUS
Status: DISPENSED
Start: 2017-04-12

## (undated) RX ORDER — CEFAZOLIN SODIUM 1 G/3ML
INJECTION, POWDER, FOR SOLUTION INTRAMUSCULAR; INTRAVENOUS
Status: DISPENSED
Start: 2017-04-12

## (undated) RX ORDER — HYDROXYZINE HYDROCHLORIDE 25 MG/1
TABLET, FILM COATED ORAL
Status: DISPENSED
Start: 2017-04-12